# Patient Record
Sex: MALE | Race: WHITE | Employment: OTHER | URBAN - METROPOLITAN AREA
[De-identification: names, ages, dates, MRNs, and addresses within clinical notes are randomized per-mention and may not be internally consistent; named-entity substitution may affect disease eponyms.]

---

## 2017-01-12 ENCOUNTER — TELEPHONE (OUTPATIENT)
Dept: FAMILY MEDICINE CLINIC | Age: 72
End: 2017-01-12

## 2017-01-12 RX ORDER — TRAZODONE HYDROCHLORIDE 50 MG/1
50 TABLET ORAL NIGHTLY
Qty: 30 TABLET | Refills: 5 | Status: SHIPPED | OUTPATIENT
Start: 2017-01-12 | End: 2017-10-09 | Stop reason: SDUPTHER

## 2017-03-28 PROBLEM — J02.9 SORE THROAT: Status: ACTIVE | Noted: 2017-03-28

## 2017-04-14 ENCOUNTER — TELEPHONE (OUTPATIENT)
Dept: FAMILY MEDICINE CLINIC | Age: 72
End: 2017-04-14

## 2017-04-14 RX ORDER — AZITHROMYCIN 250 MG/1
TABLET, FILM COATED ORAL
Qty: 1 PACKET | Refills: 0 | Status: SHIPPED | OUTPATIENT
Start: 2017-04-14 | End: 2017-04-24

## 2017-05-30 ENCOUNTER — TELEPHONE (OUTPATIENT)
Dept: FAMILY MEDICINE CLINIC | Age: 72
End: 2017-05-30

## 2017-06-06 ENCOUNTER — OFFICE VISIT (OUTPATIENT)
Dept: CARDIOLOGY CLINIC | Age: 72
End: 2017-06-06

## 2017-06-06 VITALS
WEIGHT: 185 LBS | HEART RATE: 69 BPM | SYSTOLIC BLOOD PRESSURE: 134 MMHG | BODY MASS INDEX: 23 KG/M2 | DIASTOLIC BLOOD PRESSURE: 84 MMHG | HEIGHT: 75 IN

## 2017-06-06 DIAGNOSIS — I35.1 AORTIC VALVE INSUFFICIENCY, UNSPECIFIED ETIOLOGY: Chronic | ICD-10-CM

## 2017-06-06 DIAGNOSIS — I10 ESSENTIAL HYPERTENSION, BENIGN: Chronic | ICD-10-CM

## 2017-06-06 DIAGNOSIS — E78.00 PURE HYPERCHOLESTEROLEMIA: Primary | Chronic | ICD-10-CM

## 2017-06-06 PROCEDURE — 99213 OFFICE O/P EST LOW 20 MIN: CPT | Performed by: INTERNAL MEDICINE

## 2017-08-29 ENCOUNTER — OFFICE VISIT (OUTPATIENT)
Dept: DERMATOLOGY | Age: 72
End: 2017-08-29

## 2017-08-29 DIAGNOSIS — L57.0 AK (ACTINIC KERATOSIS): ICD-10-CM

## 2017-08-29 DIAGNOSIS — L82.1 SK (SEBORRHEIC KERATOSIS): Primary | ICD-10-CM

## 2017-08-29 DIAGNOSIS — Z85.828 HISTORY OF BASAL CELL CARCINOMA: ICD-10-CM

## 2017-08-29 PROCEDURE — 17000 DESTRUCT PREMALG LESION: CPT | Performed by: DERMATOLOGY

## 2017-08-29 PROCEDURE — 99202 OFFICE O/P NEW SF 15 MIN: CPT | Performed by: DERMATOLOGY

## 2017-08-29 PROCEDURE — 17003 DESTRUCT PREMALG LES 2-14: CPT | Performed by: DERMATOLOGY

## 2017-08-29 RX ORDER — ATORVASTATIN CALCIUM 10 MG/1
10 TABLET, FILM COATED ORAL DAILY
COMMUNITY
End: 2019-09-17 | Stop reason: SDUPTHER

## 2017-09-08 ENCOUNTER — OFFICE VISIT (OUTPATIENT)
Dept: CARDIOLOGY CLINIC | Age: 72
End: 2017-09-08

## 2017-09-08 VITALS
HEIGHT: 75 IN | WEIGHT: 190 LBS | DIASTOLIC BLOOD PRESSURE: 62 MMHG | BODY MASS INDEX: 23.62 KG/M2 | HEART RATE: 61 BPM | SYSTOLIC BLOOD PRESSURE: 112 MMHG

## 2017-09-08 DIAGNOSIS — I34.0 MITRAL VALVE INSUFFICIENCY, UNSPECIFIED ETIOLOGY: Primary | Chronic | ICD-10-CM

## 2017-09-08 DIAGNOSIS — I35.1 AORTIC VALVE REGURGITATION, UNSPECIFIED ETIOLOGY: Chronic | ICD-10-CM

## 2017-09-08 DIAGNOSIS — I10 ESSENTIAL HYPERTENSION, BENIGN: Chronic | ICD-10-CM

## 2017-09-08 DIAGNOSIS — E78.2 MIXED HYPERLIPIDEMIA: Chronic | ICD-10-CM

## 2017-09-08 PROCEDURE — 99214 OFFICE O/P EST MOD 30 MIN: CPT | Performed by: INTERNAL MEDICINE

## 2017-09-08 PROCEDURE — 93000 ELECTROCARDIOGRAM COMPLETE: CPT | Performed by: INTERNAL MEDICINE

## 2017-10-10 RX ORDER — TRAZODONE HYDROCHLORIDE 50 MG/1
TABLET ORAL
Qty: 30 TABLET | Refills: 2 | Status: SHIPPED | OUTPATIENT
Start: 2017-10-10 | End: 2018-03-09 | Stop reason: SDUPTHER

## 2017-10-18 ENCOUNTER — TELEPHONE (OUTPATIENT)
Dept: FAMILY MEDICINE CLINIC | Age: 72
End: 2017-10-18

## 2017-10-18 PROBLEM — Z86.69 HX OF MIGRAINE HEADACHES: Status: ACTIVE | Noted: 2017-10-18

## 2017-10-18 PROBLEM — C61 PROSTATE CANCER (HCC): Status: ACTIVE | Noted: 2017-10-18

## 2017-11-07 RX ORDER — ZOLPIDEM TARTRATE 5 MG/1
TABLET ORAL
Qty: 90 TABLET | Refills: 1 | OUTPATIENT
Start: 2017-11-07 | End: 2018-05-14 | Stop reason: SDUPTHER

## 2018-03-23 ENCOUNTER — HOSPITAL ENCOUNTER (OUTPATIENT)
Dept: CARDIOLOGY | Age: 73
Discharge: HOME | End: 2018-03-23
Attending: INTERNAL MEDICINE | Admitting: INTERNAL MEDICINE

## 2018-03-23 ENCOUNTER — OFFICE VISIT (OUTPATIENT)
Dept: CARDIOLOGY CLINIC | Age: 73
End: 2018-03-23

## 2018-03-23 VITALS
SYSTOLIC BLOOD PRESSURE: 130 MMHG | DIASTOLIC BLOOD PRESSURE: 64 MMHG | HEIGHT: 75 IN | WEIGHT: 192 LBS | BODY MASS INDEX: 23.87 KG/M2 | HEART RATE: 66 BPM

## 2018-03-23 DIAGNOSIS — I35.1 AORTIC VALVE INSUFFICIENCY, ETIOLOGY OF CARDIAC VALVE DISEASE UNSPECIFIED: Chronic | ICD-10-CM

## 2018-03-23 DIAGNOSIS — I35.9 AORTIC VALVE DISORDER: ICD-10-CM

## 2018-03-23 DIAGNOSIS — I34.0 MITRAL VALVE INSUFFICIENCY, UNSPECIFIED ETIOLOGY: Chronic | ICD-10-CM

## 2018-03-23 DIAGNOSIS — I10 ESSENTIAL HYPERTENSION, BENIGN: Primary | Chronic | ICD-10-CM

## 2018-03-23 DIAGNOSIS — E78.2 MIXED HYPERLIPIDEMIA: Chronic | ICD-10-CM

## 2018-03-23 LAB
LEFT VENTRICULAR EJECTION FRACTION HIGH VALUE: 60 %
LEFT VENTRICULAR EJECTION FRACTION MODE: NORMAL
LV EF: 55 %
LV EF: 58 %
LVEF MODALITY: NORMAL

## 2018-03-23 PROCEDURE — 99214 OFFICE O/P EST MOD 30 MIN: CPT | Performed by: INTERNAL MEDICINE

## 2018-03-23 NOTE — PROGRESS NOTES
Vitals:    03/23/18 1432   BP: 130/64   Site: Left Arm   Position: Sitting   Cuff Size: Medium Adult   Pulse: 66   Weight: 192 lb (87.1 kg)   Height: 6' 3\" (1.905 m)     Body mass index is 24 kg/m². Wt Readings from Last 3 Encounters:   03/23/18 192 lb (87.1 kg)   10/17/17 190 lb (86.2 kg)   09/08/17 190 lb (86.2 kg)      BP Readings from Last 3 Encounters:   03/23/18 130/64   10/17/17 132/80   09/08/17 112/62      Constitutional and General Appearance:  appears stated age  Eyes - no xanthelasma  Respiratory:  · Normal excursion and expansion without use of accessory muscles  · Resp Auscultation: Normal breath sounds without dullness  Cardiovascular:  · The apical impulses not displaced  · Heart is regular rate and rhythm with normal S1, S2, 2/6 holosystolic murmur in the left lateral decubitus position, 1/6 BARRON, no clearly audible diastolic murmur  · PMI is normal  · The carotid upstroke is normal, no bruit noted   · JVP is not elevated  · Peripheral pulses are symmetrical  · There is no clubbing, cyanosis of the extremities  · No edema  · No varicosities  · Femoral Arteries: 2+ and equal without bruits  · Pedal Pulses: 2+ and equal   Abdomen:  · No masses or tenderness  · Aorta not palpable  · Normal bowel sounds  Neurological/Psychiatric:  · Alert and oriented x3  · Moves all extremities well  · Exhibits normal gait balance and coordination      Assessment/Plan  1. Essential hypertension, benign -stable   2. Aortic valve insufficiency, etiology of cardiac valve disease unspecified -stable  Echo 3/23/18: EF 55%, MVP, mild to moderate MR, mild to moderate AR   3. Mixed hyperlipidemia -will be seeing new physician and obtain thru her   4. Mitral valve insufficiency, unspecified etiology -stable see above       Echo findings explained. Continue regular exercise. FU 1 year. Will let me know if SOB or decrease in exercise tolerance. Thank you for allowing to me to participate in the care of Lili Jose.

## 2018-04-30 RX ORDER — METOPROLOL SUCCINATE 50 MG/1
TABLET, EXTENDED RELEASE ORAL
Qty: 90 TABLET | Refills: 0 | Status: SHIPPED | OUTPATIENT
Start: 2018-04-30 | End: 2018-07-17 | Stop reason: SDUPTHER

## 2018-04-30 RX ORDER — ESCITALOPRAM OXALATE 20 MG/1
TABLET ORAL
Qty: 90 TABLET | Refills: 0 | Status: SHIPPED | OUTPATIENT
Start: 2018-04-30 | End: 2018-10-22

## 2018-05-14 RX ORDER — ZOLPIDEM TARTRATE 5 MG/1
TABLET ORAL
Qty: 60 TABLET | Refills: 0 | OUTPATIENT
Start: 2018-05-14 | End: 2018-07-17 | Stop reason: SDUPTHER

## 2018-06-15 DIAGNOSIS — F41.1 GENERALIZED ANXIETY DISORDER: Primary | ICD-10-CM

## 2018-06-20 RX ORDER — TRAZODONE HYDROCHLORIDE 50 MG/1
TABLET ORAL
Qty: 30 TABLET | Refills: 0 | Status: SHIPPED | OUTPATIENT
Start: 2018-06-20 | End: 2018-07-17

## 2018-07-17 ENCOUNTER — OFFICE VISIT (OUTPATIENT)
Dept: FAMILY MEDICINE CLINIC | Age: 73
End: 2018-07-17

## 2018-07-17 VITALS
WEIGHT: 194 LBS | HEART RATE: 65 BPM | OXYGEN SATURATION: 97 % | BODY MASS INDEX: 24.25 KG/M2 | DIASTOLIC BLOOD PRESSURE: 80 MMHG | SYSTOLIC BLOOD PRESSURE: 132 MMHG

## 2018-07-17 DIAGNOSIS — F32.A DEPRESSION, UNSPECIFIED DEPRESSION TYPE: ICD-10-CM

## 2018-07-17 DIAGNOSIS — G47.00 INSOMNIA, UNSPECIFIED TYPE: ICD-10-CM

## 2018-07-17 DIAGNOSIS — L72.3 SEBACEOUS CYST: ICD-10-CM

## 2018-07-17 DIAGNOSIS — F41.9 ANXIETY: ICD-10-CM

## 2018-07-17 DIAGNOSIS — E78.2 MIXED HYPERLIPIDEMIA: Chronic | ICD-10-CM

## 2018-07-17 DIAGNOSIS — I34.0 MITRAL VALVE INSUFFICIENCY, UNSPECIFIED ETIOLOGY: Chronic | ICD-10-CM

## 2018-07-17 DIAGNOSIS — Z12.11 COLON CANCER SCREENING: ICD-10-CM

## 2018-07-17 DIAGNOSIS — I10 ESSENTIAL HYPERTENSION, BENIGN: Primary | Chronic | ICD-10-CM

## 2018-07-17 PROBLEM — J02.9 SORE THROAT: Status: RESOLVED | Noted: 2017-03-28 | Resolved: 2018-07-17

## 2018-07-17 PROCEDURE — G8510 SCR DEP NEG, NO PLAN REQD: HCPCS | Performed by: FAMILY MEDICINE

## 2018-07-17 PROCEDURE — 3288F FALL RISK ASSESSMENT DOCD: CPT | Performed by: FAMILY MEDICINE

## 2018-07-17 PROCEDURE — 99214 OFFICE O/P EST MOD 30 MIN: CPT | Performed by: FAMILY MEDICINE

## 2018-07-17 RX ORDER — METOPROLOL SUCCINATE 50 MG/1
TABLET, EXTENDED RELEASE ORAL
Qty: 90 TABLET | Refills: 1 | Status: SHIPPED | OUTPATIENT
Start: 2018-07-17 | End: 2019-01-15 | Stop reason: SDUPTHER

## 2018-07-17 RX ORDER — DULOXETIN HYDROCHLORIDE 30 MG/1
60 CAPSULE, DELAYED RELEASE ORAL DAILY
Qty: 60 CAPSULE | Refills: 2 | Status: SHIPPED | OUTPATIENT
Start: 2018-07-17 | End: 2018-10-20 | Stop reason: SDUPTHER

## 2018-07-17 RX ORDER — ZOLPIDEM TARTRATE 5 MG/1
TABLET ORAL
Qty: 90 TABLET | Refills: 1 | Status: SHIPPED | OUTPATIENT
Start: 2018-07-17 | End: 2018-07-24

## 2018-07-17 ASSESSMENT — PATIENT HEALTH QUESTIONNAIRE - PHQ9
1. LITTLE INTEREST OR PLEASURE IN DOING THINGS: 0
SUM OF ALL RESPONSES TO PHQ QUESTIONS 1-9: 1
SUM OF ALL RESPONSES TO PHQ9 QUESTIONS 1 & 2: 0
SUM OF ALL RESPONSES TO PHQ9 QUESTIONS 1 & 2: 1
2. FEELING DOWN, DEPRESSED OR HOPELESS: 1
SUM OF ALL RESPONSES TO PHQ QUESTIONS 1-9: 0
1. LITTLE INTEREST OR PLEASURE IN DOING THINGS: 0
2. FEELING DOWN, DEPRESSED OR HOPELESS: 0

## 2018-07-17 ASSESSMENT — ENCOUNTER SYMPTOMS
COUGH: 0
SHORTNESS OF BREATH: 0

## 2018-07-17 NOTE — PROGRESS NOTES
migraine without mention of status migrainosus     Mitral valve disorders(424.0)     Other and unspecified hyperlipidemia     Prostate cancer (HCC)     tx with cyber knife radiation    Vitreous degeneration      Past Surgical History:   Procedure Laterality Date    TONSILLECTOMY      TURP       No Known Allergies  Outpatient Prescriptions Marked as Taking for the 7/17/18 encounter (Office Visit) with Amedeo Osgood, MD   Medication Sig Dispense Refill    metoprolol succinate (TOPROL XL) 50 MG extended release tablet TAKE 1 TABLET EVERY DAY 90 tablet 1    zolpidem (AMBIEN) 5 MG tablet TAKE 1 TABLET  NIGHTLY AS NEEDED FOR SLEEP. 90 tablet 1    DULoxetine (CYMBALTA) 30 MG extended release capsule Take 2 capsules by mouth daily 60 capsule 2    zoster recombinant adjuvanted vaccine (SHINGRIX) 50 MCG SUSR injection Inject 0.5 mLs into the muscle once for 1 dose Repeat dose in 2-6 months. 0.5 mL 0    escitalopram (LEXAPRO) 20 MG tablet TAKE 1 TABLET EVERY DAY 90 tablet 0    butalbital-acetaminophen-caffeine (FIORICET, ESGIC) -40 MG per tablet TAKE ONE TABLET BY MOUTH EVERY 4 HOURS AS NEEDED FOR HEADACHE 180 tablet 0    LYSINE PO Take by mouth      atorvastatin (LIPITOR) 10 MG tablet Take 10 mg by mouth daily      Probiotic Product (ALIGN PO) Take 1 tablet by mouth daily.  vitamin B-12 (CYANOCOBALAMIN) 500 MCG tablet Take 500 mcg by mouth daily. Social History   Substance Use Topics    Smoking status: Never Smoker    Smokeless tobacco: Never Used    Alcohol use 0.0 oz/week      Comment: occasional     Family History   Problem Relation Age of Onset    Hypertension Mother     Heart Disease Mother     Stroke Mother     Heart Attack Father          Review of Systems   Constitutional: Negative for activity change, chills, fatigue and fever. Respiratory: Negative for cough and shortness of breath. Cardiovascular: Negative for chest pain and leg swelling.    Musculoskeletal: Positive for arthralgias. Skin:        Skin bump left side of neck       Objective:    /80   Pulse 65   Wt 194 lb (88 kg)   SpO2 97%   BMI 24.25 kg/m²   Weight: 194 lb (88 kg)     BP Readings from Last 3 Encounters:   07/17/18 132/80   03/23/18 130/64   10/17/17 132/80     Wt Readings from Last 3 Encounters:   07/17/18 194 lb (88 kg)   03/23/18 192 lb (87.1 kg)   10/17/17 190 lb (86.2 kg)       Physical Exam   Constitutional: He appears well-developed and well-nourished. Cardiovascular: Normal rate, regular rhythm and normal heart sounds. No murmur heard. 2+pedal pulses; no LE edema   Pulmonary/Chest: Effort normal and breath sounds normal. No respiratory distress. He has no wheezes. He has no rales. Skin:   Left side neck 1cm nodule; sebaceous gland enlargement. Expression of white cheesy substance with gentl pressure. Psychiatric: He has a normal mood and affect. His behavior is normal.       Assessment/Plan:    1. Essential hypertension, benign  Continue current medication.   - metoprolol succinate (TOPROL XL) 50 MG extended release tablet; TAKE 1 TABLET EVERY DAY  Dispense: 90 tablet; Refill: 1  - Comprehensive Metabolic Panel; Future  - CBC Auto Differential; Future    2. Mixed hyperlipidemia    - Lipid Panel; Future    3. Mitral valve insufficiency, unspecified etiology  Follows with cardiology; will schedule regular visit with them prior to preop exam with us. 4. Colon cancer screening    - Jenn Kellogg MD ([facility])    5. Insomnia, unspecified type  Gets relief with the ambien; usually breaks in half. Discussed that sleep should improve with better anxiety control; but if not can consider switch to ER version.  - zolpidem (AMBIEN) 5 MG tablet; TAKE 1 TABLET  NIGHTLY AS NEEDED FOR SLEEP. Dispense: 90 tablet; Refill: 1    6. Depression, unspecified depression type  Stop lexapro and start cymbalta. - DULoxetine (CYMBALTA) 30 MG extended release capsule;  Take 2 capsules by mouth

## 2018-07-24 DIAGNOSIS — F41.1 GENERALIZED ANXIETY DISORDER: ICD-10-CM

## 2018-07-24 RX ORDER — TRAZODONE HYDROCHLORIDE 50 MG/1
TABLET ORAL
Qty: 30 TABLET | Refills: 0
Start: 2018-07-24 | End: 2018-10-20 | Stop reason: SDUPTHER

## 2018-08-21 DIAGNOSIS — F41.1 GENERALIZED ANXIETY DISORDER: ICD-10-CM

## 2018-08-22 RX ORDER — TRAZODONE HYDROCHLORIDE 50 MG/1
TABLET ORAL
Qty: 30 TABLET | Refills: 0 | Status: SHIPPED | OUTPATIENT
Start: 2018-08-22 | End: 2018-08-30 | Stop reason: SDUPTHER

## 2018-08-23 ENCOUNTER — HOSPITAL ENCOUNTER (OUTPATIENT)
Dept: ENDOSCOPY | Age: 73
Discharge: HOME | End: 2018-08-23
Attending: INTERNAL MEDICINE | Admitting: INTERNAL MEDICINE

## 2018-08-23 DIAGNOSIS — I35.9 AORTIC VALVE DISORDER: ICD-10-CM

## 2018-08-23 ASSESSMENT — ENCOUNTER SYMPTOMS: DYSPNEA ACTIVITY LEVEL: AFTER AMBULATING 2 FLIGHTS OF STAIRS

## 2018-08-23 NOTE — ANESTHESIA PRE-OP
[address] [facility] Anesthesiology  Pre-Anesthesia Evaluation/Consultation       Name:  Cyndi Butt                                         Age:  68 y.o. MRN:    [id number]           Procedure (Scheduled): COLONOSCOPY   Surgeon:     Dr. Kesha Hewitt MD     No Known Allergies  Patient Active Problem List   Diagnosis    Essential hypertension, benign    Aortic insufficiency    Vitreous degeneration    Hypertrophy of prostate without urinary obstruction and other lower urinary tract symptoms (LUTS)    Irritable bowel syndrome    Anxiety state    Hyperlipidemia    Mitral regurgitation    Prostate cancer ([address]    Hx of migraine headaches     Past Medical History:   Diagnosis Date    Anxiety state, unspecified     Arthritis 03/2017    Hyperlipidemia     Hypertension     Hypertrophy of prostate without urinary obstruction and other lower urinary tract symptoms (LUTS)     Irritable bowel syndrome     Migraine, unspecified, without mention of intractable migraine without mention of status migrainosus     Mitral valve disorders(424.0)     Other and unspecified hyperlipidemia     Prostate cancer (HCC)     tx with cyber knife radiation    Vitreous degeneration      Past Surgical History:   Procedure Laterality Date    TONSILLECTOMY      TURP       Social History   Substance Use Topics    Smoking status: Never Smoker    Smokeless tobacco: Never Used    Alcohol use 0.0 oz/week      Comment: occasional       Prior to Admission medications    Medication Sig Start Date End Date Taking?  Authorizing Provider   traZODone (DESYREL) 50 MG tablet TAKE ONE TABLET BY MOUTH ONCE NIGHTLY 8/22/18   Kacie Quinn MD   traZODone (DESYREL) 50 MG tablet TAKE ONE TABLET BY MOUTH ONCE NIGHTLY 7/24/18   Kacie Quinn MD   metoprolol succinate (TOPROL XL) 50 MG extended release tablet TAKE 1 TABLET EVERY DAY 7/17/18   Kacie Quinn MD   DULoxetine (CYMBALTA) 30 MG extended release capsule Take 2 hypertension: moderate, valvular problems/murmurs: AI and MR, MENEZES: after ambulating 2 flights of stairs, hyperlipidemia    (-)  angina, orthopnea and PND    ECG reviewed  Rhythm: regular  Rate: normal  Echocardiogram reviewed         Beta Blocker:  Dose within 24 Hrs         Neuro/Psych:   (+) headaches: migraine headaches, depression/anxiety             GI/Hepatic/Renal:             Endo/Other:    (+) : arthritis: OA., .                 Abdominal:           Vascular:                                        Anesthesia Plan      MAC     ASA 3       Induction: intravenous. Anesthetic plan and risks discussed with patient. Plan discussed with CRNA. DOS STAFF ADDENDUM:    Pt seen and examined, chart reviewed (including anesthesia, drug and allergy history). No interval changes to history and physical examination. Anesthetic plan, risks, benefits, alternatives, and personnel involved discussed with patient. Patient verbalized an understanding and agrees to proceed.       Shaye Peres DO  August 23, 2018  8:53 AM

## 2018-08-30 ENCOUNTER — OFFICE VISIT (OUTPATIENT)
Dept: DERMATOLOGY | Age: 73
End: 2018-08-30

## 2018-08-30 DIAGNOSIS — L57.0 AK (ACTINIC KERATOSIS): Primary | ICD-10-CM

## 2018-08-30 DIAGNOSIS — L82.1 SK (SEBORRHEIC KERATOSIS): ICD-10-CM

## 2018-08-30 DIAGNOSIS — L72.0 EPIDERMOID CYST: ICD-10-CM

## 2018-08-30 DIAGNOSIS — L30.8 ASTEATOTIC DERMATITIS: ICD-10-CM

## 2018-08-30 PROCEDURE — 17000 DESTRUCT PREMALG LESION: CPT | Performed by: DERMATOLOGY

## 2018-08-30 PROCEDURE — 17003 DESTRUCT PREMALG LES 2-14: CPT | Performed by: DERMATOLOGY

## 2018-08-30 PROCEDURE — 99213 OFFICE O/P EST LOW 20 MIN: CPT | Performed by: DERMATOLOGY

## 2018-08-30 RX ORDER — TRIAMCINOLONE ACETONIDE 1 MG/G
CREAM TOPICAL
Qty: 80 G | Refills: 1 | Status: SHIPPED | OUTPATIENT
Start: 2018-08-30 | End: 2019-11-12 | Stop reason: SDUPTHER

## 2018-10-20 DIAGNOSIS — F41.9 ANXIETY: ICD-10-CM

## 2018-10-20 DIAGNOSIS — F41.1 GENERALIZED ANXIETY DISORDER: ICD-10-CM

## 2018-10-20 DIAGNOSIS — F32.A DEPRESSION, UNSPECIFIED DEPRESSION TYPE: ICD-10-CM

## 2018-10-20 RX ORDER — DULOXETIN HYDROCHLORIDE 30 MG/1
CAPSULE, DELAYED RELEASE ORAL
Qty: 60 CAPSULE | Refills: 0 | Status: SHIPPED | OUTPATIENT
Start: 2018-10-20 | End: 2018-11-20 | Stop reason: SDUPTHER

## 2018-10-20 RX ORDER — TRAZODONE HYDROCHLORIDE 50 MG/1
TABLET ORAL
Qty: 30 TABLET | Refills: 0 | Status: SHIPPED | OUTPATIENT
Start: 2018-10-20 | End: 2018-11-21 | Stop reason: SDUPTHER

## 2018-10-22 ENCOUNTER — OFFICE VISIT (OUTPATIENT)
Dept: CARDIOLOGY CLINIC | Age: 73
End: 2018-10-22
Payer: MEDICARE

## 2018-10-22 VITALS
HEIGHT: 75 IN | DIASTOLIC BLOOD PRESSURE: 70 MMHG | BODY MASS INDEX: 24 KG/M2 | SYSTOLIC BLOOD PRESSURE: 130 MMHG | WEIGHT: 193 LBS | HEART RATE: 72 BPM

## 2018-10-22 DIAGNOSIS — I10 ESSENTIAL HYPERTENSION, BENIGN: Primary | Chronic | ICD-10-CM

## 2018-10-22 DIAGNOSIS — E78.2 MIXED HYPERLIPIDEMIA: Chronic | ICD-10-CM

## 2018-10-22 DIAGNOSIS — I34.0 MITRAL VALVE INSUFFICIENCY, UNSPECIFIED ETIOLOGY: Chronic | ICD-10-CM

## 2018-10-22 PROCEDURE — 93000 ELECTROCARDIOGRAM COMPLETE: CPT | Performed by: INTERNAL MEDICINE

## 2018-10-22 PROCEDURE — 99214 OFFICE O/P EST MOD 30 MIN: CPT | Performed by: INTERNAL MEDICINE

## 2018-11-02 ENCOUNTER — OFFICE VISIT (OUTPATIENT)
Dept: FAMILY MEDICINE CLINIC | Age: 73
End: 2018-11-02
Payer: MEDICARE

## 2018-11-02 VITALS
TEMPERATURE: 97.8 F | DIASTOLIC BLOOD PRESSURE: 82 MMHG | OXYGEN SATURATION: 97 % | BODY MASS INDEX: 23.75 KG/M2 | HEART RATE: 78 BPM | SYSTOLIC BLOOD PRESSURE: 120 MMHG | HEIGHT: 75 IN | WEIGHT: 191 LBS

## 2018-11-02 DIAGNOSIS — F41.1 ANXIETY STATE: ICD-10-CM

## 2018-11-02 DIAGNOSIS — I34.0 MITRAL VALVE INSUFFICIENCY, UNSPECIFIED ETIOLOGY: Chronic | ICD-10-CM

## 2018-11-02 DIAGNOSIS — I35.1 AORTIC VALVE INSUFFICIENCY, ETIOLOGY OF CARDIAC VALVE DISEASE UNSPECIFIED: Chronic | ICD-10-CM

## 2018-11-02 DIAGNOSIS — I10 ESSENTIAL HYPERTENSION, BENIGN: Chronic | ICD-10-CM

## 2018-11-02 DIAGNOSIS — M16.11 ARTHRITIS OF RIGHT HIP: Primary | ICD-10-CM

## 2018-11-02 DIAGNOSIS — E78.2 MIXED HYPERLIPIDEMIA: Chronic | ICD-10-CM

## 2018-11-02 PROCEDURE — 99213 OFFICE O/P EST LOW 20 MIN: CPT | Performed by: FAMILY MEDICINE

## 2018-11-02 NOTE — PROGRESS NOTES
well-nourished. HENT:   Mouth/Throat: Uvula is midline and mucous membranes are normal.   Cardiovascular: Normal rate and regular rhythm. Murmur heard. Systolic murmur is present with a grade of 2/6   2+pedal pulses; no LE edema   Pulmonary/Chest: Effort normal and breath sounds normal. No respiratory distress. He has no wheezes. He has no rales. Abdominal: Soft. Bowel sounds are normal.   Neurological: He is alert and oriented to person, place, and time. He has normal strength. Reflex Scores:       Bicep reflexes are 2+ on the right side and 2+ on the left side. Brachioradialis reflexes are 2+ on the right side and 2+ on the left side. Patellar reflexes are 2+ on the right side and 2+ on the left side. Psychiatric: He has a normal mood and affect. His speech is normal.         EKG Interpretation: reviewed by cardiology  Lab Review Yes       Assessment:     [facility] was seen today for pre-op exam.    Diagnoses and all orders for this visit:    Arthritis of right hip - awaiting right total hip    Essential hypertension, benign- stable; continue current medications    Mixed hyperlipidemia- stable; continue current medication    Aortic valve insufficiency, etiology of cardiac valve disease unspecified- stable; follows with cardiology    Mitral valve insufficiency, unspecified etiology- stable; follows with cardiology    Anxiety state- stable; ok to increase cymbalta to 60mg at night and 30mg in morning when he desires; he will let us know. 68 y. o.patient  approved for Surgery        Plan:     1. Preoperative workup as follows:none  2. Change in medication regimen before surgery: hold all medications morning of surgery except metoprolol  3. No contraindications to planned surgery    Note electronically signed by provider.

## 2018-11-20 DIAGNOSIS — F41.9 ANXIETY: ICD-10-CM

## 2018-11-20 DIAGNOSIS — F32.A DEPRESSION, UNSPECIFIED DEPRESSION TYPE: ICD-10-CM

## 2018-11-20 RX ORDER — DULOXETIN HYDROCHLORIDE 30 MG/1
CAPSULE, DELAYED RELEASE ORAL
Qty: 60 CAPSULE | Refills: 5 | Status: SHIPPED | OUTPATIENT
Start: 2018-11-20 | End: 2019-05-21 | Stop reason: SDUPTHER

## 2018-11-21 DIAGNOSIS — F41.1 GENERALIZED ANXIETY DISORDER: ICD-10-CM

## 2018-11-21 RX ORDER — TRAZODONE HYDROCHLORIDE 50 MG/1
TABLET ORAL
Qty: 90 TABLET | Refills: 1 | Status: SHIPPED | OUTPATIENT
Start: 2018-11-21 | End: 2019-07-22 | Stop reason: SDUPTHER

## 2018-12-10 ENCOUNTER — TELEPHONE (OUTPATIENT)
Dept: PHARMACY | Facility: CLINIC | Age: 73
End: 2018-12-10

## 2018-12-10 DIAGNOSIS — Z96.641 HISTORY OF TOTAL RIGHT HIP ARTHROPLASTY: Primary | ICD-10-CM

## 2018-12-10 PROCEDURE — 1111F DSCHRG MED/CURRENT MED MERGE: CPT

## 2018-12-10 RX ORDER — ZOLPIDEM TARTRATE 5 MG/1
5 TABLET ORAL NIGHTLY PRN
COMMUNITY
End: 2019-04-04 | Stop reason: SDUPTHER

## 2018-12-10 NOTE — TELEPHONE ENCOUNTER
CLINICAL PHARMACY NOTE  Post-Discharge Transitions of Care ([facility])    Non-face-to-face services provided:  Assessment and support for treatment adherence and medication management-Medication Review    Subjective/Objective:  Whitley Richards is a 68 y.o. male. Patient was discharged from [facility] on 11-13-18 with a diagnosis of primary osteoarthritis of right hip resulting in total hip replacement. Patient outreach to review discharge medications and provide medication review and management. Spoke with patient    No Known Allergies    Medication Sig    aspirin 325 MG tablet  · New medication at discharge - pt to complete 30 days of therapy for DVT prophylaxis. Last dose on 12/13/18. Added to home med list.  Take 325 mg by mouth daily For 30 days for DVT prophylaxis s/p hip replacement - last dose on 12/13/18.  zolpidem (AMBIEN) 5 MG tablet  · Added to home medication list as pt reported. Take 5 mg by mouth nightly as needed for Sleep. Chetna Shrestha traZODone (DESYREL) 50 MG tablet TAKE ONE TABLET BY MOUTH ONCE NIGHTLY    DULoxetine (CYMBALTA) 30 MG extended release capsule TAKE TWO CAPSULES BY MOUTH DAILY    Misc Natural Products (FIBER 7 PO) Take 1 tablet by mouth daily     butalbital-acetaminophen-caffeine (FIORICET, ESGIC) -40 MG per tablet TAKE ONE TABLET BY MOUTH EVERY 4 HOURS AS NEEDED FOR HEADACHE    triamcinolone (KENALOG) 0.1 % cream Apply to affected areas on the legs twice daily for up to 2 weeks or until improved.  metoprolol succinate (TOPROL XL) 50 MG extended release tablet TAKE 1 TABLET EVERY DAY    LYSINE PO Take 1 tablet by mouth daily     atorvastatin (LIPITOR) 10 MG tablet Take 10 mg by mouth daily    Probiotic Product (ALIGN PO) Take 1 tablet by mouth daily.  vitamin B-12 (CYANOCOBALAMIN) 500 MCG tablet Take 500 mcg by mouth daily. Meds to Beds:NA    CrCl cannot be calculated (Patient's most recent lab result is older than the maximum 120 days allowed. ).

## 2018-12-11 ENCOUNTER — TELEPHONE (OUTPATIENT)
Dept: PHARMACY | Facility: CLINIC | Age: 73
End: 2018-12-11

## 2018-12-11 RX ORDER — ASPIRIN 325 MG
325 TABLET ORAL DAILY
COMMUNITY
End: 2019-02-21

## 2019-01-11 ENCOUNTER — TELEPHONE (OUTPATIENT)
Dept: CARDIOLOGY CLINIC | Age: 74
End: 2019-01-11

## 2019-01-15 DIAGNOSIS — I10 ESSENTIAL HYPERTENSION, BENIGN: Chronic | ICD-10-CM

## 2019-01-15 RX ORDER — METOPROLOL SUCCINATE 50 MG/1
TABLET, EXTENDED RELEASE ORAL
Qty: 90 TABLET | Refills: 0 | Status: SHIPPED | OUTPATIENT
Start: 2019-01-15 | End: 2019-04-22 | Stop reason: SDUPTHER

## 2019-02-21 ENCOUNTER — OFFICE VISIT (OUTPATIENT)
Dept: PRIMARY CARE CLINIC | Age: 74
End: 2019-02-21
Payer: MEDICARE

## 2019-02-21 ENCOUNTER — TELEPHONE (OUTPATIENT)
Dept: CARDIOLOGY CLINIC | Age: 74
End: 2019-02-21

## 2019-02-21 VITALS
TEMPERATURE: 97.1 F | BODY MASS INDEX: 25.62 KG/M2 | RESPIRATION RATE: 18 BRPM | DIASTOLIC BLOOD PRESSURE: 78 MMHG | HEIGHT: 73 IN | HEART RATE: 81 BPM | WEIGHT: 193.3 LBS | SYSTOLIC BLOOD PRESSURE: 127 MMHG

## 2019-02-21 DIAGNOSIS — Z01.818 PRE-OP EVALUATION: Primary | ICD-10-CM

## 2019-02-21 PROCEDURE — 99212 OFFICE O/P EST SF 10 MIN: CPT | Performed by: INTERNAL MEDICINE

## 2019-02-21 RX ORDER — BUTALBITAL, ACETAMINOPHEN AND CAFFEINE 50; 325; 40 MG/1; MG/1; MG/1
TABLET ORAL
Qty: 180 TABLET | Refills: 0 | Status: SHIPPED | OUTPATIENT
Start: 2019-02-21 | End: 2019-09-10 | Stop reason: SDUPTHER

## 2019-02-21 ASSESSMENT — PATIENT HEALTH QUESTIONNAIRE - PHQ9
2. FEELING DOWN, DEPRESSED OR HOPELESS: 0
SUM OF ALL RESPONSES TO PHQ QUESTIONS 1-9: 0
SUM OF ALL RESPONSES TO PHQ9 QUESTIONS 1 & 2: 0
1. LITTLE INTEREST OR PLEASURE IN DOING THINGS: 0
SUM OF ALL RESPONSES TO PHQ QUESTIONS 1-9: 0

## 2019-03-19 ENCOUNTER — TELEPHONE (OUTPATIENT)
Dept: PHARMACY | Facility: CLINIC | Age: 74
End: 2019-03-19

## 2019-03-19 DIAGNOSIS — Z79.899 ENCOUNTER FOR MEDICATION REVIEW: Primary | ICD-10-CM

## 2019-03-19 PROCEDURE — 1111F DSCHRG MED/CURRENT MED MERGE: CPT | Performed by: PHARMACIST

## 2019-03-25 RX ORDER — ASPIRIN 81 MG/1
81 TABLET, CHEWABLE ORAL 2 TIMES DAILY
COMMUNITY
Start: 2019-02-26 | End: 2019-03-28

## 2019-04-04 ENCOUNTER — OFFICE VISIT (OUTPATIENT)
Dept: FAMILY MEDICINE CLINIC | Age: 74
End: 2019-04-04
Payer: MEDICARE

## 2019-04-04 VITALS
HEART RATE: 83 BPM | BODY MASS INDEX: 25.15 KG/M2 | OXYGEN SATURATION: 96 % | SYSTOLIC BLOOD PRESSURE: 134 MMHG | DIASTOLIC BLOOD PRESSURE: 86 MMHG | WEIGHT: 188 LBS

## 2019-04-04 DIAGNOSIS — G47.00 INSOMNIA, UNSPECIFIED TYPE: ICD-10-CM

## 2019-04-04 DIAGNOSIS — E78.2 MIXED HYPERLIPIDEMIA: ICD-10-CM

## 2019-04-04 DIAGNOSIS — C61 PROSTATE CANCER (HCC): ICD-10-CM

## 2019-04-04 DIAGNOSIS — R42 VERTIGO: Primary | ICD-10-CM

## 2019-04-04 DIAGNOSIS — F41.1 ANXIETY STATE: ICD-10-CM

## 2019-04-04 PROCEDURE — 99214 OFFICE O/P EST MOD 30 MIN: CPT | Performed by: FAMILY MEDICINE

## 2019-04-04 RX ORDER — ZOLPIDEM TARTRATE 5 MG/1
5 TABLET ORAL NIGHTLY PRN
Qty: 30 TABLET | Refills: 3 | Status: SHIPPED | OUTPATIENT
Start: 2019-04-04 | End: 2019-11-21 | Stop reason: SDUPTHER

## 2019-04-04 SDOH — HEALTH STABILITY: MENTAL HEALTH: HOW OFTEN DO YOU HAVE A DRINK CONTAINING ALCOHOL?: 4 OR MORE TIMES A WEEK

## 2019-04-04 SDOH — HEALTH STABILITY: MENTAL HEALTH: HOW MANY STANDARD DRINKS CONTAINING ALCOHOL DO YOU HAVE ON A TYPICAL DAY?: 1 OR 2

## 2019-04-04 NOTE — PROGRESS NOTES
Patient is here today for Annual Check up. Former patient of Dr. Heath Sis here to establish care with me. Has several complaints. Symptoms started 8 day(s) ago. States dizziness feels like vertigo. Symptoms are worse with lying down. When lying in bed turned over and felt like the room kept moving, didn't last too long, was careful when getting up. Sx's seem more today and feel \"balanced\" related, feels like the balance is \"off kilter. \"    Nausea and/or vomiting: no  Tinnitus/ringing in ears: yes  Numbness or tingling: no  Weakness: yes  Recent cold symptoms: no  Has history of vertigo: yes    Has tried nothing for the symptoms. Has a lump on the left side of the neck for 6-7 months, bothersome at times once it started growing. Has seen dermatologist Dr. Frank Bustillo in the past for this. Told has a cyst.     Had prostate cancer a year ago, just saw urology and good report PSA down. Right hip replacement in November with Dr. Mikey Norman. Doing well. Left Total knee in February with Dr. Mikey Norman, had a lot of pain at night, reaction to oxycodone from surgery and started taking tramadol. No longer needing the tramadol but will manage the pain with 2 ibuprofen a day. Dr. Mikey Norman advised to take flexeril. Saw Dr. Mikey Norman 1 week ago for surgery follow up, another follow up appt is scheduled weeks out. Having some drainage from the surgical site, covering with a band-aid. Taking fioricet 1 daily for \"occular migraines. \"  Has been on for years, works well no SE.    2-3 weeks after knee surgery had a lot of trouble sleeping from pain, got more depressed, felt more tired during the day and still feel tired, in PT twice a week, not but now sleeping better and mood has improved. Takes 1/2 tablet of the Ambien 5mg for sleep, if having difficulty with sleep with take the other half. No SE with meds. Taking cymbalta for depression, doing well and no SE. Not seeing a therapist or counselors.   Saw a therapist when in his 20's, had a good experience with therapy. Doing better now. Patient's medications, allergies, past medical, surgical, social and family histories were reviewed and updated in the EHR as appropriate. Body mass index is 25.15 kg/m². Vitals:    04/04/19 1437   BP: 134/86   Site: Left Upper Arm   Position: Sitting   Cuff Size: Medium Adult   Pulse: 83   SpO2: 96%   Weight: 188 lb (85.3 kg)     Wt Readings from Last 3 Encounters:   04/04/19 188 lb (85.3 kg)   02/21/19 193 lb 4.8 oz (87.7 kg)   11/02/18 191 lb (86.6 kg)     PHQ score: PHQ-9 Total Score: 0 (2/21/2019  1:41 PM)      GENERAL:Alert and oriented x 4 NAD, affect appropriate and overweight, well hydrated, well developed. PERRL, EOMI, no nystagmus  NECK:supple and non tender without mass, no thyromegaly or thyroid nodules, no cervical lymphadenopathy  LUNG:clear to auscultation bilaterally with normal respiratory effort  CV: Normal heart sounds, regular rate and rhythm without murmurs  EXTREMETY: no loss of hair, no edema, normal pedal pulses bilaterally  CN grossly intact   Normal UE and LE strength bilaterally  Normal reflexes bilaterally knee and elbow  Normal sensation to light touch  Normal FTN, ARIANA normal, neg rhomberg, neg pronator drift  Normal gait  Tandem gait slightly off balance, heel walk normal, toe walk normal        ASSESSMENT AND PLAN:       Jodee Flores was seen today for annual exam.    Diagnoses and all orders for this visit:    Vertigo  ? Viral  Monitor sx  If not improving over next week consider further eval    Insomnia, unspecified type  -     zolpidem (AMBIEN) 5 MG tablet; Take 1 tablet by mouth nightly as needed for Sleep for up to 30 days.  -Stable, continue current medications. Mixed hyperlipidemia  -Stable, continue current medications. Anxiety state  -Stable, continue current medications. Prostate cancer [facility])  F/u with urology as scheduled    Other orders  -     mupirocin (BACTROBAN) 2 % ointment;  Apply topically 3 times

## 2019-04-05 ENCOUNTER — TELEPHONE (OUTPATIENT)
Dept: DERMATOLOGY | Age: 74
End: 2019-04-05

## 2019-04-05 NOTE — TELEPHONE ENCOUNTER
Patient called has a spot on the back of his neck. It is getting bigger and would like for it to be removed.     Call back# [phone]

## 2019-04-22 DIAGNOSIS — I10 ESSENTIAL HYPERTENSION, BENIGN: Chronic | ICD-10-CM

## 2019-04-22 RX ORDER — METOPROLOL SUCCINATE 50 MG/1
TABLET, EXTENDED RELEASE ORAL
Qty: 90 TABLET | Refills: 0 | Status: SHIPPED | OUTPATIENT
Start: 2019-04-22 | End: 2019-07-18 | Stop reason: SDUPTHER

## 2019-05-03 ENCOUNTER — PROCEDURE VISIT (OUTPATIENT)
Dept: DERMATOLOGY | Age: 74
End: 2019-05-03
Payer: MEDICARE

## 2019-05-03 DIAGNOSIS — L72.0 EPIDERMOID CYST: Primary | ICD-10-CM

## 2019-05-03 DIAGNOSIS — R20.9 DISTURBANCE OF SKIN SENSATION: ICD-10-CM

## 2019-05-03 PROCEDURE — 99999 PR OFFICE/OUTPT VISIT,PROCEDURE ONLY: CPT | Performed by: DERMATOLOGY

## 2019-05-03 PROCEDURE — 11422 EXC H-F-NK-SP B9+MARG 1.1-2: CPT | Performed by: DERMATOLOGY

## 2019-05-03 NOTE — PATIENT INSTRUCTIONS
Wound Care Instructions    · Keep the bandage in place for 24 hours. · Cleanse the wound with mild soapy water daily   Gently dry the area.  Apply Vaseline or petroleum jelly to the wound using a cotton tipped applicator.  Cover with a clean bandage.  Repeat this process until the biopsy site is healed.  If you had stitches placed, continue treating the site until the stitches are removed. Remember to make an appointment to return to have your stitches removed by our staff.  You may shower and bathe as usual.       ** Biopsy results generally take around 7 business days to come back. If you have not heard from us by then, please call the office at [phone] between 8AM and 4PM Monday through Friday.

## 2019-05-03 NOTE — PROGRESS NOTES
[facility]ermat[facility]  Anitha Dempsey MD  [address] [place] [address]  1945    68 y.o. male     Date of Visit: 5/3/2019    Chief Complaint: cyst on the neck    History of Present Illness:    1. Here today for an enlarging painful cyst on the neck. Had LT TKA on 2/25/19. Had RT total hip replacement in Nov 2018. Review of Systems:  Gen: Feels well, good sense of health. Heme: No abnormal bruising or bleeding. Past Medical History, Family History, Surgical History, Medications and Allergies reviewed. Past Medical History:   Diagnosis Date    Anxiety state, unspecified     Arthritis 03/2017    Hyperlipidemia     Hypertension     Hypertrophy of prostate without urinary obstruction and other lower urinary tract symptoms (LUTS)     Irritable bowel syndrome     Migraine, unspecified, without mention of intractable migraine without mention of status migrainosus     Mitral valve disorders(424.0)     Other and unspecified hyperlipidemia     Prostate cancer (HCC)     tx with cyber knife radiation    Vitreous degeneration      Past Surgical History:   Procedure Laterality Date    HIP ARTHROPLASTY Right 11/2018    Ottoniel tyler, Dr. Angeline Ann Right     x3 due to infection    JOINT REPLACEMENT  02/2019    KNEE ARTHROPLASTY Left 02/2019    Dr. Chou Pale   Allergen Reactions    Oxycodone      \"severe hiccups\" per patient on at least 2 occasions; does tolerate tramadol     Outpatient Medications Marked as Taking for the 5/3/19 encounter (Procedure visit) with Héctor Mcintyre MD   Medication Sig Dispense Refill    metoprolol succinate (TOPROL XL) 50 MG extended release tablet TAKE ONE TABLET BY MOUTH DAILY 90 tablet 0    zolpidem (AMBIEN) 5 MG tablet Take 1 tablet by mouth nightly as needed for Sleep for up to 30 days.  30 tablet 3    IBUPROFEN PO Take 200-400 mg by mouth 2 times daily as needed     

## 2019-05-21 DIAGNOSIS — F41.9 ANXIETY: ICD-10-CM

## 2019-05-21 DIAGNOSIS — F32.A DEPRESSION, UNSPECIFIED DEPRESSION TYPE: ICD-10-CM

## 2019-05-21 RX ORDER — DULOXETIN HYDROCHLORIDE 30 MG/1
CAPSULE, DELAYED RELEASE ORAL
Qty: 60 CAPSULE | Refills: 12 | Status: SHIPPED | OUTPATIENT
Start: 2019-05-21 | End: 2020-05-27

## 2019-07-18 DIAGNOSIS — I10 ESSENTIAL HYPERTENSION, BENIGN: Chronic | ICD-10-CM

## 2019-07-18 RX ORDER — METOPROLOL SUCCINATE 50 MG/1
TABLET, EXTENDED RELEASE ORAL
Qty: 90 TABLET | Refills: 3 | Status: SHIPPED | OUTPATIENT
Start: 2019-07-18 | End: 2020-06-19

## 2019-07-22 ENCOUNTER — OFFICE VISIT (OUTPATIENT)
Dept: FAMILY MEDICINE CLINIC | Age: 74
End: 2019-07-22
Payer: MEDICARE

## 2019-07-22 VITALS
SYSTOLIC BLOOD PRESSURE: 118 MMHG | OXYGEN SATURATION: 95 % | TEMPERATURE: 98.3 F | BODY MASS INDEX: 25.55 KG/M2 | DIASTOLIC BLOOD PRESSURE: 76 MMHG | WEIGHT: 191 LBS

## 2019-07-22 DIAGNOSIS — R05.9 COUGH: Primary | ICD-10-CM

## 2019-07-22 PROCEDURE — 99213 OFFICE O/P EST LOW 20 MIN: CPT | Performed by: FAMILY MEDICINE

## 2019-07-22 RX ORDER — TRAZODONE HYDROCHLORIDE 50 MG/1
TABLET ORAL
Qty: 90 TABLET | Refills: 1 | Status: SHIPPED | OUTPATIENT
Start: 2019-07-22 | End: 2020-01-06

## 2019-07-25 RX ORDER — BENZONATATE 200 MG/1
200 CAPSULE ORAL 3 TIMES DAILY PRN
Qty: 30 CAPSULE | Refills: 0 | Status: SHIPPED | OUTPATIENT
Start: 2019-07-25 | End: 2019-08-01

## 2019-07-25 RX ORDER — AZITHROMYCIN 250 MG/1
250 TABLET, FILM COATED ORAL SEE ADMIN INSTRUCTIONS
Qty: 6 TABLET | Refills: 0 | Status: SHIPPED | OUTPATIENT
Start: 2019-07-25 | End: 2019-07-30

## 2019-08-05 RX ORDER — BENZONATATE 200 MG/1
200 CAPSULE ORAL 3 TIMES DAILY PRN
Qty: 30 CAPSULE | Refills: 0 | Status: SHIPPED | OUTPATIENT
Start: 2019-08-05 | End: 2019-08-12

## 2019-08-05 NOTE — TELEPHONE ENCOUNTER
Patient is in [place] and he had finished a Zpac about 5 days ago for a cough and congestion . The congestion is better but the cough is not . The cough is just as bad as it has been and he is wondering if you are willing to call in something to a pharmacy for the cough . Pharmacy [facility] , [address] [place] [address]. Please advise .

## 2019-08-24 ENCOUNTER — OFFICE VISIT (OUTPATIENT)
Dept: FAMILY MEDICINE CLINIC | Age: 74
End: 2019-08-24
Payer: MEDICARE

## 2019-08-24 VITALS
SYSTOLIC BLOOD PRESSURE: 140 MMHG | WEIGHT: 190 LBS | DIASTOLIC BLOOD PRESSURE: 90 MMHG | HEART RATE: 69 BPM | BODY MASS INDEX: 25.41 KG/M2 | OXYGEN SATURATION: 99 %

## 2019-08-24 DIAGNOSIS — K62.5 BRIGHT RED RECTAL BLEEDING: Primary | ICD-10-CM

## 2019-08-24 PROCEDURE — 99213 OFFICE O/P EST LOW 20 MIN: CPT | Performed by: FAMILY MEDICINE

## 2019-08-24 ASSESSMENT — ENCOUNTER SYMPTOMS
DIARRHEA: 1
RECTAL PAIN: 0
HEMATOCHEZIA: 1

## 2019-08-24 NOTE — PROGRESS NOTES
Mother     Stroke Mother     Heart Attack Father     Diabetes type 2  Brother     Colon Cancer Brother 76    No Known Problems Brother      Social History     Socioeconomic History    Marital status:      Spouse name: Not on file    Number of children: Not on file    Years of education: Not on file    Highest education level: Not on file   Occupational History    Not on file   Social Needs    Financial resource strain: Not on file    Food insecurity:     Worry: Not on file     Inability: Not on file    Transportation needs:     Medical: Not on file     Non-medical: Not on file   Tobacco Use    Smoking status: Never Smoker    Smokeless tobacco: Never Used   Substance and Sexual Activity    Alcohol use:  Yes     Alcohol/week: 0.0 standard drinks     Frequency: 4 or more times a week     Drinks per session: 1 or 2     Binge frequency: Never     Comment: occasional    Drug use: No    Sexual activity: Not on file   Lifestyle    Physical activity:     Days per week: Not on file     Minutes per session: Not on file    Stress: Not on file   Relationships    Social connections:     Talks on phone: Not on file     Gets together: Not on file     Attends [employer] service: Not on file     Active member of club or organization: Not on file     Attends meetings of clubs or organizations: Not on file     Relationship status: Not on file    Intimate partner violence:     Fear of current or ex partner: Not on file     Emotionally abused: Not on file     Physically abused: Not on file     Forced sexual activity: Not on file   Other Topics Concern    Not on file   Social History Narrative    Not on file     Allergies   Allergen Reactions    Oxycodone      \"severe hiccups\" per patient on at least 2 occasions; does tolerate tramadol     Current Outpatient Medications   Medication Sig Dispense Refill    traZODone (DESYREL) 50 MG tablet TAKE ONE TABLET BY MOUTH ONCE NIGHTLY 90 tablet 1    metoprolol succinate (TOPROL XL) 50 MG extended release tablet TAKE ONE TABLET BY MOUTH DAILY 90 tablet 3    DULoxetine (CYMBALTA) 30 MG extended release capsule TAKE TWO CAPSULES BY MOUTH DAILY 60 capsule 12    IBUPROFEN PO Take 200-400 mg by mouth 2 times daily as needed      butalbital-acetaminophen-caffeine (FIORICET, ESGIC) -40 MG per tablet TAKE ONE TABLET BY MOUTH EVERY 4 HOURS AS NEEDED FOR HEADACHE 180 tablet 0    Misc Natural Products (FIBER 7 PO) Take 1 tablet by mouth daily       triamcinolone (KENALOG) 0.1 % cream Apply to affected areas on the legs twice daily for up to 2 weeks or until improved. 80 g 1    LYSINE PO Take 1 tablet by mouth daily       atorvastatin (LIPITOR) 10 MG tablet Take 10 mg by mouth daily       No current facility-administered medications for this visit. Review of Systems   Constitutional: Positive for fatigue. Negative for fever. Gastrointestinal: Positive for diarrhea ( soft stools,rarely watery. Lot of rumbling in abdomen), hematochezia and hemorrhoids. Negative for rectal pain. Neurological: Positive for headaches. Psychiatric/Behavioral: The patient is nervous/anxious ( feeling tense. ). OBJECTIVE:    BP (!) 140/90 (Position: Standing)   Pulse 69   Wt 190 lb (86.2 kg)   SpO2 99%   BMI 25.41 kg/m²    Physical Exam   Constitutional: He appears well-developed and well-nourished. No distress. HENT:   Head: Normocephalic and atraumatic. Pulmonary/Chest: Effort normal.   Genitourinary: Rectal exam shows internal hemorrhoid. Rectal exam shows no fissure, no mass, no tenderness and anal tone normal.   Genitourinary Comments: Anoscopic exam revealed some blood. No active bleeding Hemorrhoid seen internally. Vitals reviewed. ASSESSMENT/PLAN:    Rios Torres was seen today for rectal bleeding. Diagnoses and all orders for this visit:    Bright red rectal bleeding    Etiology is most likely from bleeding internal hemorrhoid.   Orthostatic vital signs are

## 2019-08-29 ENCOUNTER — HOSPITAL ENCOUNTER (OUTPATIENT)
Age: 74
Discharge: HOME | End: 2019-08-29
Payer: MEDICARE

## 2019-08-30 ENCOUNTER — HOSPITAL ENCOUNTER (OUTPATIENT)
Age: 74
Discharge: HOME | End: 2019-08-30
Payer: MEDICARE

## 2019-08-30 DIAGNOSIS — E78.2 MIXED HYPERLIPIDEMIA: Primary | Chronic | ICD-10-CM

## 2019-08-30 DIAGNOSIS — I10 ESSENTIAL HYPERTENSION, BENIGN: Chronic | ICD-10-CM

## 2019-08-30 LAB — C DIFF TOXIN/ANTIGEN: NORMAL

## 2019-08-30 PROCEDURE — 87329 GIARDIA AG IA: CPT

## 2019-08-30 PROCEDURE — 87324 CLOSTRIDIUM AG IA: CPT

## 2019-08-30 PROCEDURE — 87449 NOS EACH ORGANISM AG IA: CPT

## 2019-08-31 LAB — GIARDIA ANTIGEN STOOL: NORMAL

## 2019-09-03 ENCOUNTER — OFFICE VISIT (OUTPATIENT)
Dept: DERMATOLOGY | Age: 74
End: 2019-09-03
Payer: MEDICARE

## 2019-09-03 DIAGNOSIS — L20.84 INTRINSIC ATOPIC DERMATITIS: Primary | ICD-10-CM

## 2019-09-03 DIAGNOSIS — Z85.828 HISTORY OF BASAL CELL CARCINOMA (BCC): ICD-10-CM

## 2019-09-03 PROCEDURE — 99213 OFFICE O/P EST LOW 20 MIN: CPT | Performed by: DERMATOLOGY

## 2019-09-03 RX ORDER — TRIAMCINOLONE ACETONIDE 1 MG/G
CREAM TOPICAL
Qty: 450 G | Refills: 2 | Status: SHIPPED | OUTPATIENT
Start: 2019-09-03 | End: 2020-09-08

## 2019-09-03 NOTE — PROGRESS NOTES
BY MOUTH ONCE NIGHTLY 90 tablet 1    metoprolol succinate (TOPROL XL) 50 MG extended release tablet TAKE ONE TABLET BY MOUTH DAILY 90 tablet 3    DULoxetine (CYMBALTA) 30 MG extended release capsule TAKE TWO CAPSULES BY MOUTH DAILY 60 capsule 12    IBUPROFEN PO Take 200-400 mg by mouth 2 times daily as needed      butalbital-acetaminophen-caffeine (FIORICET, ESGIC) -40 MG per tablet TAKE ONE TABLET BY MOUTH EVERY 4 HOURS AS NEEDED FOR HEADACHE 180 tablet 0    Misc Natural Products (FIBER 7 PO) Take 1 tablet by mouth daily       triamcinolone (KENALOG) 0.1 % cream Apply to affected areas on the legs twice daily for up to 2 weeks or until improved. 80 g 1    LYSINE PO Take 1 tablet by mouth daily       atorvastatin (LIPITOR) 10 MG tablet Take 10 mg by mouth daily         Physical Examination       The following were examined and determined to be normal: Psych/Neuro, Scalp/hair, Head/face, Conjunctivae/eyelids, Gums/teeth/lips, Neck and Nails/digits. The following were examined and determined to be abnormal: Breast/axilla/chest, Abdomen, Back, RUE, LUE, RLE and LLE. Well-appearing. 1.  Scattered on the trunk and extremities are large ill-defined scaly erythematous patches. 2.  Clear. Assessment and Plan     1. Intrinsic atopic dermatitis - 8 to 10% BSA    Triamcinolone 0.1% cream twice daily for up to 2 weeks or until improved. Avoid hot showers and encouraged daily use of a thicker cream based moisturizer. 2. History of basal cell carcinoma (BCC) - clear    Sun protective behaviors and self skin examinations were encouraged. Call for any new or concerning lesions. Return in about 1 year (around 9/3/2020).

## 2019-09-10 RX ORDER — BUTALBITAL, ACETAMINOPHEN AND CAFFEINE 50; 325; 40 MG/1; MG/1; MG/1
TABLET ORAL
Qty: 180 TABLET | Refills: 0 | Status: SHIPPED | OUTPATIENT
Start: 2019-09-10 | End: 2020-02-11 | Stop reason: SDUPTHER

## 2019-09-16 NOTE — TELEPHONE ENCOUNTER
Patient requesting a medication refill.   Medication atorvastatin (LIPITOR) 10 MG tablet  Pharmacy [facility] [address] [place] [address] [phone] [facility] [phone]  Last office visit: 07/22/2019  Next office visit: Visit date not found

## 2019-09-17 RX ORDER — ATORVASTATIN CALCIUM 10 MG/1
10 TABLET, FILM COATED ORAL DAILY
Qty: 90 TABLET | Refills: 3 | Status: SHIPPED | OUTPATIENT
Start: 2019-09-17 | End: 2020-07-30

## 2019-11-12 ENCOUNTER — OFFICE VISIT (OUTPATIENT)
Dept: CARDIOLOGY CLINIC | Age: 74
End: 2019-11-12
Payer: MEDICARE

## 2019-11-12 VITALS
OXYGEN SATURATION: 98 % | BODY MASS INDEX: 23.31 KG/M2 | HEIGHT: 75 IN | DIASTOLIC BLOOD PRESSURE: 78 MMHG | SYSTOLIC BLOOD PRESSURE: 132 MMHG | HEART RATE: 67 BPM | WEIGHT: 187.5 LBS

## 2019-11-12 DIAGNOSIS — E78.5 HYPERLIPIDEMIA, UNSPECIFIED HYPERLIPIDEMIA TYPE: ICD-10-CM

## 2019-11-12 DIAGNOSIS — I35.1 NONRHEUMATIC AORTIC VALVE INSUFFICIENCY: Chronic | ICD-10-CM

## 2019-11-12 DIAGNOSIS — I10 ESSENTIAL HYPERTENSION: Primary | ICD-10-CM

## 2019-11-12 PROCEDURE — 99214 OFFICE O/P EST MOD 30 MIN: CPT | Performed by: INTERNAL MEDICINE

## 2019-11-21 DIAGNOSIS — G47.00 INSOMNIA, UNSPECIFIED TYPE: ICD-10-CM

## 2019-11-21 RX ORDER — ZOLPIDEM TARTRATE 5 MG/1
TABLET ORAL
Qty: 30 TABLET | Refills: 2 | Status: SHIPPED | OUTPATIENT
Start: 2019-11-21 | End: 2020-05-27

## 2020-01-06 RX ORDER — TRAZODONE HYDROCHLORIDE 50 MG/1
TABLET ORAL
Qty: 90 TABLET | Refills: 0 | Status: SHIPPED | OUTPATIENT
Start: 2020-01-06 | End: 2020-02-28

## 2020-02-11 RX ORDER — BUTALBITAL, ACETAMINOPHEN AND CAFFEINE 50; 325; 40 MG/1; MG/1; MG/1
TABLET ORAL
Qty: 180 TABLET | Refills: 1 | Status: SHIPPED | OUTPATIENT
Start: 2020-02-11 | End: 2021-05-25

## 2020-02-14 ENCOUNTER — OFFICE VISIT (OUTPATIENT)
Dept: FAMILY MEDICINE CLINIC | Age: 75
End: 2020-02-14
Payer: MEDICARE

## 2020-02-14 ENCOUNTER — NURSE TRIAGE (OUTPATIENT)
Dept: OTHER | Facility: CLINIC | Age: 75
End: 2020-02-14

## 2020-02-14 VITALS
WEIGHT: 190 LBS | SYSTOLIC BLOOD PRESSURE: 104 MMHG | BODY MASS INDEX: 23.75 KG/M2 | HEART RATE: 115 BPM | OXYGEN SATURATION: 98 % | DIASTOLIC BLOOD PRESSURE: 70 MMHG

## 2020-02-14 PROCEDURE — 99214 OFFICE O/P EST MOD 30 MIN: CPT | Performed by: FAMILY MEDICINE

## 2020-02-14 RX ORDER — CYCLOBENZAPRINE HCL 10 MG
TABLET ORAL
COMMUNITY
Start: 2020-01-29

## 2020-02-14 NOTE — TELEPHONE ENCOUNTER
Reason for Disposition   Unexplained headache that is present > 24 hours    Protocols used: HEADACHE-ADULT-[place]    Pt c/o headaches that are intermittent that started about a month ago. He has been seen at [facility] for this, but it has been a couple of weeks ago. He states the steroid helped , but it isn't anymore. He has also been on ibuprofen, Fioricet and muscle relaxer. At times his pain is 6/10, right now it is 3-4/10. Pt states he is able to function during the days. Pt with h/o occular migraines. The pain is generally on one side of his head and goes down into his neck. Caller reports symptoms as documented above. Caller informed of disposition. Soft transfer to pre-service center to schedule appointment as recommended. Care advice as documented. Please do not respond to the triage nurse through this encounter. Any subsequent communication should be directly with the patient.

## 2020-02-14 NOTE — PROGRESS NOTES
Patient presents today for headaches for 5 weeks. Pt reports 5 weeks ago he left for [place] and started with headaches . Headaches are not every day but seem to be worse in the mornings, primarily on the Left side behind the eye and radiating up to the top of his head into his neck, took fioricet without relief, states normally fioricet helps with his usual HA. States usually taking 1 tablet of the fioricet every day or every other day, but if getting an occular migraine will take 1 every 4 hours. Pt states before he went to [place] he saw the eye doctor for floaters in the R eye, was checked out and cleared. He states this is not his normal headache. Pt states does not keep him from working, but when the headaches are at their worst he will feel like he wants to go home to sleep. States cold cloths on his head help. Denies light sensitivity, no nausea, no watery eyes, not seeing any floaters or waves, no n/v.    He reports going to Urgent Care in [place] twice for his headaches. Pt states Urgent care gave him norco and prednisone for 5 days for his first visit and the second visit a Depo medrol and dexamethasone injection, and oral flexeril. He states the injections helped but could tell when they wore off. Pt states while he was taking the prednisone the headaches went away, but came back once rx was completed. Pt continues to take the flexeril and fioricet without relief, states not taking flexeril on a regular basis. Pt states he lost his son 3 months ago, has been having dreams and when he wakes up from the dream his head will be \"pounding. \"  He states for a few nights he had dreams he was responsible for someone he could not take care of or someone drowning and he couldn't get to them. He reports be open to see a counselor. 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 Denies neck pain but does have pain if he pushes on his neck. No pain in hips or shoulders.   No numbness or weakness. He states when he sleeps on his side at night he will sometimes get numbness in hand but changes position and goes away. Wife reports to patient he snores at night, no gasping for air. Wife reports to him that his snoring doesn't sound like regular snoring but says it is getting worse. Allergies   Allergen Reactions    Oxycodone      \"severe hiccups\" per patient on at least 2 occasions; does tolerate tramadol       Vitals:    02/14/20 1630   BP: 104/70   Site: Left Upper Arm   Position: Sitting   Cuff Size: Medium Adult   Pulse: 115   SpO2: 98%   Weight: 190 lb (86.2 kg)     Wt Readings from Last 3 Encounters:   02/14/20 190 lb (86.2 kg)   11/12/19 187 lb 8 oz (85 kg)   08/24/19 190 lb (86.2 kg)     Body mass index is 23.75 kg/m². Alert and oriented x 4 NAD, affect appropriate and normal appearing weight, well hydrated, well developed. Bilateral pinna, canal and TM normal  Neck no bruits  Neck FROM w/o pain, NT palpation spinous processes and paraspinal muscles  Bilateral temporal arteries prominent, NT to palpation  Lungs CTA B with good air movement  CV RRR no M appreciated  CN grossly intact  PERRL, EOMI, no nystagmus  Normal UE and LE strength shoulder, elbow, , Hip flexor 4+/5 R (previous hip surgery), 5/5 on L, knee flex/ext 5/5 bilaterally, foot plantar/dorsiflex 5/5 bilaterally  Normal sensation to light touch upper and lower extremities bilaterally  Normal FTN, ARIANA normal, neg rhomberg, neg pronator drift  Normal gait  Tandem gait slightly unsteady but able to do        ASSESSMENT AND PLAN:       Michelle Erickson was seen today for headache. Diagnoses and all orders for this visit:    Acute intractable headache, unspecified headache type  -     SEDIMENTATION RATE;  Future  -     C-REACTIVE PROTEIN; Future    Check labs to r/o temporal arteritis  May need imaging if labs unrevealing       Snoring  -     Rand Recio MD, Sleep Medicine, [facility]    Grief reaction  Discussed

## 2020-02-15 LAB
C-REACTIVE PROTEIN WIDE RANGE: 14.7 MG/L
SEDIMENTATION RATE, ERYTHROCYTE: 3 MM/HR (ref 0–20)

## 2020-02-18 RX ORDER — PREDNISONE 20 MG/1
TABLET ORAL
Qty: 90 TABLET | Refills: 1 | Status: SHIPPED
Start: 2020-02-18 | End: 2020-05-11 | Stop reason: DRUGHIGH

## 2020-02-18 NOTE — TELEPHONE ENCOUNTER
Start prednisone 20mg tabs, 3 tabs daily (60mg) #90 1 RF    Have him get a CRP and ESR level in 2-3 weeks    Tell him if it is temporal arteritis he will be on steroids for 6-12 months and we will be gradually tapering the dose.      He will need monthly lab tests so please make the CRP and ESR standing orders

## 2020-02-25 ENCOUNTER — PATIENT MESSAGE (OUTPATIENT)
Dept: FAMILY MEDICINE CLINIC | Age: 75
End: 2020-02-25

## 2020-02-25 NOTE — TELEPHONE ENCOUNTER
From: Nima Marie  To: Mesha Shea MD  Sent: 2/25/2020 10:11 AM EST  Subject: Visit Follow-Up Question    Doing much better on the predisone. Know that I'm supposed to get a blood test. Would like to do that at [facility] in [facility]. Could the order either be sent to them, or sent to me so I don't need to come to [place] to .   Thanks  [employer]

## 2020-02-28 RX ORDER — TRAZODONE HYDROCHLORIDE 50 MG/1
TABLET ORAL
Qty: 90 TABLET | Refills: 2 | Status: SHIPPED | OUTPATIENT
Start: 2020-02-28 | End: 2020-10-13

## 2020-03-03 ENCOUNTER — OFFICE VISIT (OUTPATIENT)
Dept: VASCULAR SURGERY | Age: 75
End: 2020-03-03
Payer: MEDICARE

## 2020-03-03 VITALS
SYSTOLIC BLOOD PRESSURE: 140 MMHG | BODY MASS INDEX: 23.25 KG/M2 | HEIGHT: 75 IN | DIASTOLIC BLOOD PRESSURE: 80 MMHG | WEIGHT: 187 LBS

## 2020-03-03 LAB
C-REACTIVE PROTEIN WIDE RANGE: 0.28 MG/L
SEDIMENTATION RATE, ERYTHROCYTE: 0 MM/HR (ref 0–20)

## 2020-03-03 PROCEDURE — 99203 OFFICE O/P NEW LOW 30 MIN: CPT | Performed by: SURGERY

## 2020-03-03 ASSESSMENT — ENCOUNTER SYMPTOMS
RESPIRATORY NEGATIVE: 1
GASTROINTESTINAL NEGATIVE: 1
ALLERGIC/IMMUNOLOGIC NEGATIVE: 1

## 2020-03-10 ENCOUNTER — PATIENT MESSAGE (OUTPATIENT)
Dept: FAMILY MEDICINE CLINIC | Age: 75
End: 2020-03-10

## 2020-05-11 RX ORDER — PREDNISONE 1 MG/1
TABLET ORAL
Qty: 70 TABLET | Refills: 1 | Status: SHIPPED
Start: 2020-05-11 | End: 2020-06-15 | Stop reason: DRUGHIGH

## 2020-05-27 RX ORDER — DULOXETIN HYDROCHLORIDE 30 MG/1
CAPSULE, DELAYED RELEASE ORAL
Qty: 60 CAPSULE | Refills: 11 | Status: SHIPPED | OUTPATIENT
Start: 2020-05-27 | End: 2021-07-19

## 2020-05-27 RX ORDER — ZOLPIDEM TARTRATE 5 MG/1
TABLET ORAL
Qty: 30 TABLET | Refills: 1 | Status: SHIPPED | OUTPATIENT
Start: 2020-05-27 | End: 2020-11-02

## 2020-06-15 ENCOUNTER — TELEPHONE (OUTPATIENT)
Dept: FAMILY MEDICINE CLINIC | Age: 75
End: 2020-06-15

## 2020-06-16 RX ORDER — PREDNISONE 1 MG/1
9 TABLET ORAL DAILY
Qty: 270 TABLET | Refills: 3 | Status: SHIPPED | OUTPATIENT
Start: 2020-06-16 | End: 2020-07-09 | Stop reason: DRUGHIGH

## 2020-06-19 RX ORDER — METOPROLOL SUCCINATE 50 MG/1
TABLET, EXTENDED RELEASE ORAL
Qty: 90 TABLET | Refills: 2 | Status: SHIPPED | OUTPATIENT
Start: 2020-06-19 | End: 2021-01-14

## 2020-07-09 ENCOUNTER — OFFICE VISIT (OUTPATIENT)
Dept: FAMILY MEDICINE CLINIC | Age: 75
End: 2020-07-09
Payer: MEDICARE

## 2020-07-09 VITALS
HEART RATE: 73 BPM | SYSTOLIC BLOOD PRESSURE: 144 MMHG | WEIGHT: 194 LBS | BODY MASS INDEX: 24.25 KG/M2 | TEMPERATURE: 97.1 F | DIASTOLIC BLOOD PRESSURE: 90 MMHG | OXYGEN SATURATION: 94 %

## 2020-07-09 PROCEDURE — 99214 OFFICE O/P EST MOD 30 MIN: CPT | Performed by: FAMILY MEDICINE

## 2020-07-09 RX ORDER — PREDNISONE 1 MG/1
8 TABLET ORAL DAILY
Qty: 270 TABLET | Refills: 3 | Status: SHIPPED
Start: 2020-07-09 | End: 2020-08-08

## 2020-07-09 RX ORDER — TAMSULOSIN HYDROCHLORIDE 0.4 MG/1
0.4 CAPSULE ORAL DAILY
COMMUNITY
End: 2020-11-24

## 2020-07-09 NOTE — PATIENT INSTRUCTIONS
Patient Education        Vertigo: Exercises  Introduction  Here are some examples of exercises for you to try. The exercises may be suggested for a condition or for rehabilitation. Start each exercise slowly. Ease off the exercises if you start to have pain. You will be told when to start these exercises and which ones will work best for you. How to do the exercises  Exercise 1   1. Stand with a chair in front of you and a wall behind you. If you begin to fall, you may use them for support. 2. Stand with your feet together and your arms at your sides. 3. Move your head up and down 10 times. Exercise 2   1. Move your head side to side 10 times. Exercise 3   1. Move your head diagonally up and down 10 times. Exercise 4   1. Move your head diagonally up and down 10 times on the other side. Follow-up care is a key part of your treatment and safety. Be sure to make and go to all appointments, and call your doctor if you are having problems. It's also a good idea to know your test results and keep a list of the medicines you take. Where can you learn more? Go to [email or web address]://[email or web address] [email or web address].Tsavo [email or web address] org and sign in to your Nimbus Discovery account. Enter F349 in the "[employer] box to learn more about \"Vertigo: Exercises. \"     If you do not have an account, please click on the \"Sign Up Now\" link. Current as of: July 29, 2019               Content Version: 12.5  © 2703-3747 Healthwise, [employer]. Care instructions adapted under license by [facility] ([facility]). If you have questions about a medical condition or this instruction, always ask your healthcare professional. [place] [address] any warranty or liability for your use of this information. Patient Education        Epley Maneuver for Vertigo: Exercises  Your Care Instructions  The Epley Maneuver is a series of movements your doctor may use to treat your vertigo. Here are the steps for the exercises.  Your doctor or physical therapist will guide you through the movements. A single 10- to 15-minute session often is all that's needed. Crystal debris (canaliths) cause the vertigo. When your head is moved into different positions, the debris moves freely. This may cause your symptoms to stop. How to do the exercises  Step 1   1. You will sit on the doctor's exam table. Your legs will be out in front of you. The doctor or physical therapist will turn your head so that it is skilled nursing between looking straight ahead and looking to the side that causes the worst vertigo. 2. Without changing your head position, he or she will guide you back quickly. Your shoulders will be on the table. Your head will hang over the edge of the table. At this point, the side of your head that is causing the worst vertigo will face the floor. You'll stay in this position for 30 seconds or until your symptoms stop. Step 2   1. Then, the doctor or physical therapist will turn your head to the other side. You don't need to lift your head. The other side of your head will face the floor. You will stay in this position for 30 seconds or until your symptoms stop. Step 3   1. The doctor or physical therapist will help you roll your body in the same direction that your head is facing. You will lie on your side. (For example, if you are looking to your right, you will roll onto your right side.) The side that causes the worst symptoms should be facing up. You'll stay in this position for another 30 seconds or until your symptoms stop. Step 4   1. The doctor or physical therapist will then help you to sit back up. Your legs will hang off the table on the same side that you were facing. Follow-up care is a key part of your treatment and safety. Be sure to make and go to all appointments, and call your doctor if you are having problems. It's also a good idea to know your test results and keep a list of the medicines you take. Where can you learn more?   Go to [email or web address]://[email or web address].[email or web address] org and sign in to your Intellicheck Mobilisa account. Enter Y011 in the NIN [employer] box to learn more about \"Epley Maneuver for Vertigo: Exercises. \"     If you do not have an account, please click on the \"Sign Up Now\" link. Current as of: July 29, 2019               Content Version: 12.5  © 2006-2020 Wowboard. Care instructions adapted under license by [facility] [place] ([facility]). If you have questions about a medical condition or this instruction, always ask your healthcare professional. [place] [address] any warranty or liability for your use of this information. Patient Education        Neck Spasm: Exercises  Introduction  Here are some examples of exercises for you to try. The exercises may be suggested for a condition or for rehabilitation. Start each exercise slowly. Ease off the exercises if you start to have pain. You will be told when to start these exercises and which ones will work best for you. How to do the exercises  Levator scapula stretch   4. Sit in a firm chair, or stand up straight. 5. Gently tilt your head toward your left shoulder. 6. Turn your head to look down into your armpit, bending your head slightly forward. Let the weight of your head stretch your neck muscles. 7. Hold for 15 to 30 seconds. 8. Return to your starting position. 9. Follow the same instructions above, but tilt your head toward your right shoulder. 10. Repeat 2 to 4 times toward each shoulder. Upper trapezius stretch   2. Sit in a firm chair, or stand up straight. 3. This stretch works best if you keep your shoulder down as you lean away from it. To help you remember to do this, start by relaxing your shoulders and lightly holding on to your thighs or your chair. 4. Tilt your head toward your shoulder and hold for 15 to 30 seconds. Let the weight of your head stretch your muscles.   5. If you would like a little added stretch, place your arm behind your back. Use the arm opposite of the direction you are tilting your head. For example, if you are tilting your head to the left, place your right arm behind your back. 6. Repeat 2 to 4 times toward each shoulder. Neck rotation   2. Sit in a firm chair, or stand up straight. 3. Keeping your chin level, turn your head to the right, and hold for 15 to 30 seconds. 4. Turn your head to the left, and hold for 15 to 30 seconds. 5. Repeat 2 to 4 times to each side. Chin tuck   2. Lie on the floor with a rolled-up towel under your neck. Your head should be touching the floor. 3. Slowly bring your chin toward the front of your neck. 4. Hold for a count of 6, and then relax for up to 10 seconds. 5. Repeat 8 to 12 times. Forward neck flexion   1. Sit in a firm chair, or stand up straight. 2. Bend your head forward. 3. Hold for 15 to 30 seconds, then return to your starting position. 4. Repeat 2 to 4 times. Follow-up care is a key part of your treatment and safety. Be sure to make and go to all appointments, and call your doctor if you are having problems. It's also a good idea to know your test results and keep a list of the medicines you take. Where can you learn more? Go to [email or web address]://[email or web address] Reference [email or web address].Riva Digital [email or web address] org and sign in to your Direct Access Software account. Enter P962 in the KylesuShip box to learn more about \"Neck Spasm: Exercises. \"     If you do not have an account, please click on the \"Sign Up Now\" link. Current as of: March 2, 2020               Content Version: 12.5  © 0855-9898 Healthwise, Incorporated. Care instructions adapted under license by [facility] ([facility]). If you have questions about a medical condition or this instruction, always ask your healthcare professional. [place] [address] any warranty or liability for your use of this information. Patient Education        Neck: Exercises  Introduction  Here are some examples of exercises for you to try.  The exercises may be suggested for a condition or for rehabilitation. Start each exercise slowly. Ease off the exercises if you start to have pain. You will be told when to start these exercises and which ones will work best for you. How to do the exercises  Neck stretch   11. This stretch works best if you keep your shoulder down as you lean away from it. To help you remember to do this, start by relaxing your shoulders and lightly holding on to your thighs or your chair. 12. Tilt your head toward your shoulder and hold for 15 to 30 seconds. Let the weight of your head stretch your muscles. 13. If you would like a little added stretch, use your hand to gently and steadily pull your head toward your shoulder. For example, keeping your right shoulder down, lean your head to the left. 14. Repeat 2 to 4 times toward each shoulder. Diagonal neck stretch   7. Turn your head slightly toward the direction you will be stretching, and tilt your head diagonally toward your chest and hold for 15 to 30 seconds. 8. If you would like a little added stretch, use your hand to gently and steadily pull your head forward on the diagonal.  9. Repeat 2 to 4 times toward each side. Dorsal glide stretch   The dorsal glide stretches the back of the neck. If you feel pain, do not glide so far back. Some people find this exercise easier to do while lying on their backs with an ice pack on the neck. 6. Sit or stand tall and look straight ahead. 7. Slowly tuck your chin as you glide your head backward over your body  8. Hold for a count of 6, and then relax for up to 10 seconds. 9. Repeat 8 to 12 times. Chest and shoulder stretch   6. Sit or stand tall and glide your head backward as in the dorsal glide stretch. 7. Raise both arms so that your hands are next to your ears. 8. Take a deep breath, and as you breathe out, lower your elbows down and behind your back.  You will feel your shoulder blades slide down and together, and at the same time you will feel a stretch across your chest and the front of your shoulders. 9. Hold for about 6 seconds, and then relax for up to 10 seconds. 10. Repeat 8 to 12 times. Strengthening: Hands on head   5. Move your head backward, forward, and side to side against gentle pressure from your hands, holding each position for about 6 seconds. 6. Repeat 8 to 12 times. Follow-up care is a key part of your treatment and safety. Be sure to make and go to all appointments, and call your doctor if you are having problems. It's also a good idea to know your test results and keep a list of the medicines you take. Where can you learn more? Go to [email or web address].Obeo [email or web address] and sign in to your Mensia Technologies account. Enter P975 in the TrendU box to learn more about \"Neck: Exercises. \"     If you do not have an account, please click on the \"Sign Up Now\" link. Current as of: March 2, 2020               Content Version: 12.5  © 2006-2020 Healthwise, Incorporated. Care instructions adapted under license by [facility] ([facility]). If you have questions about a medical condition or this instruction, always ask your healthcare professional. [address] any warranty or liability for your use of this information.

## 2020-07-09 NOTE — PROGRESS NOTES
hydrated, well developed. Bilateral pinna, canal and TM normal  Neck no bruits  Lungs CTA B with good air movement  CV RRR no M appreciated  CN grossly intact  PERRL, EOMI, no nystagmus  Normal UE and LE strength shoulder, elbow, , hip, knee and foot  Normal reflexes bilaterally knee and elbow  Normal sensation to light touch upper and lower extremities bilaterally  Normal FTN, ARIANA normal,   + rhomberg, neg pronator drift  Normal gait  Tandem gait unsteady  Intention tremor       ASSESSMENT AND PLAN:       Joo Rahman was seen today for hand problem. Diagnoses and all orders for this visit:    Nonintractable headache, unspecified chronicity pattern, unspecified headache type  -     MRI BRAIN WO CONTRAST; Future    Tremor  -     MRI BRAIN WO CONTRAST; Future    Vertigo  -     MRI BRAIN WO CONTRAST; Future    Other orders  -     predniSONE (DELTASONE) 1 MG tablet; Take 8 tablets by mouth daily        Tremor may be due to prednisone  Concern with increased HA despite low ESR and CRP levels, doubt PMR now given worsening sx  ?  Cluster HA  concernt with othe rneuro sx, needs imaging  If nl refer to neuro

## 2020-07-23 ENCOUNTER — TELEPHONE (OUTPATIENT)
Dept: FAMILY MEDICINE CLINIC | Age: 75
End: 2020-07-23

## 2020-07-23 NOTE — TELEPHONE ENCOUNTER
----- Message from Dewey Munoz MD sent at 7/21/2020  8:03 AM EDT -----  Shows some changes that could just be due to aging but given sx would like him to see neurology to confirm given his sx.   Refer to neurology  Dx: HA, dizziness

## 2020-07-23 NOTE — TELEPHONE ENCOUNTER
Patient notified referral has been placed to [address] [place] [address] [id number] Neurology and they will call the patient to schedule.

## 2020-07-30 RX ORDER — ATORVASTATIN CALCIUM 10 MG/1
TABLET, FILM COATED ORAL
Qty: 90 TABLET | Refills: 0 | Status: SHIPPED | OUTPATIENT
Start: 2020-07-30 | End: 2020-12-14

## 2020-09-08 ENCOUNTER — OFFICE VISIT (OUTPATIENT)
Dept: DERMATOLOGY | Age: 75
End: 2020-09-08
Payer: MEDICARE

## 2020-09-08 VITALS — TEMPERATURE: 96.8 F

## 2020-09-08 PROCEDURE — 17000 DESTRUCT PREMALG LESION: CPT | Performed by: DERMATOLOGY

## 2020-09-08 PROCEDURE — 99213 OFFICE O/P EST LOW 20 MIN: CPT | Performed by: DERMATOLOGY

## 2020-09-08 RX ORDER — TOPIRAMATE 50 MG/1
50 TABLET, FILM COATED ORAL 2 TIMES DAILY
COMMUNITY
End: 2021-08-06

## 2020-09-08 NOTE — PROGRESS NOTES
Formerly [facility]  Bernard Silva MD  [address] [place] [address]  1945    77 y.o. male     Date of Visit: 9/8/2020    Chief Complaint: atopic eczema, skin lesions    History of Present Illness:    1. He returns in follow-up for a history of atopic dermatitis. Reports that he has been flaring over the last few weeks mainly on the upper portion of the trunk. It is typically worse after getting out of the shower or swimming. He reports some burning with application of triamcinolone 0.1% cream in the past.    2.  He complains of a tender lesion on the left lateral neck. 3.  He also reports a stable asymptomatic lesion on the left forearm. 4.  He has a history of a basal cell carcinoma on the left superior helix that was treated in 2009 with Mohs surgery. He denies any signs of recurrence. Review of Systems:  Skin: No new or changing moles. Past Medical History, Family History, Surgical History, Medications and Allergies reviewed.     Past Medical History:   Diagnosis Date    Anxiety state, unspecified     Arthritis 03/2017    Hyperlipidemia     Hypertension     Hypertrophy of prostate without urinary obstruction and other lower urinary tract symptoms (LUTS)     Irritable bowel syndrome     Migraine, unspecified, without mention of intractable migraine without mention of status migrainosus     Mitral valve disorders(424.0)     Other and unspecified hyperlipidemia     Prostate cancer (HCC)     tx with cyber knife radiation    Vitreous degeneration      Past Surgical History:   Procedure Laterality Date    HIP ARTHROPLASTY Right 11/2018    [facility], Dr. Salvatore Licea Right     x3 due to infection    JOINT REPLACEMENT  02/2019    KNEE ARTHROPLASTY Left 02/2019    Dr. Zak Leggett   Allergen Reactions    Oxycodone      \"severe hiccups\" per patient on at least 2 occasions; does tolerate tramadol     Outpatient Medications Marked as Taking for the 9/8/20 encounter (Office Visit) with Asuncion Myers MD   Medication Sig Dispense Refill    topiramate (TOPAMAX) 50 MG tablet Take 50 mg by mouth 2 times daily      triamcinolone (KENALOG) 0.1 % ointment Apply to affected areas twice daily for up to 2 weeks or until improved. For eczema. 80 g 2    atorvastatin (LIPITOR) 10 MG tablet TAKE ONE TABLET BY MOUTH DAILY 90 tablet 0    metoprolol succinate (TOPROL XL) 50 MG extended release tablet TAKE ONE TABLET BY MOUTH DAILY 90 tablet 2    DULoxetine (CYMBALTA) 30 MG extended release capsule TAKE TWO CAPSULES BY MOUTH DAILY 60 capsule 11    traZODone (DESYREL) 50 MG tablet TAKE ONE TABLET BY MOUTH ONCE NIGHTLY 90 tablet 2    cyclobenzaprine (FLEXERIL) 10 MG tablet TAKE 1 TABLET BY MOUTH THREE TIMES A DAY      IBUPROFEN PO Take 200-400 mg by mouth 2 times daily as needed      LYSINE PO Take 1 tablet by mouth daily            Physical Examination       The following were examined and determined to be normal: Psych/Neuro, Scalp/hair, Head/face, Conjunctivae/eyelids, Gums/teeth/lips, Abdomen, Back, RUE, LUE, LLE and Nails/digits. The following were examined and determined to be abnormal: Neck, Breast/axilla/chest, Back and RLE. Well-appearing. 1.  Upper portion of the chest and back with a mixture of round scaly erythematous patches and more ill-defined scaly pink patches. Right leg and thigh - few scaly pink macules and patches. 2.  Left lateral neck with a hyperkeratotic pink papule. Frontal scalp with few skin colored scaly macules. 3.  Left distal extensor forearm with a stuck on appearing verrucous brown plaque. 4.  Clear. Assessment and Plan     1. Intrinsic atopic dermatitis -flaring right now    Triamcinolone 0.1% ointment twice daily for up to 2 weeks until improved. Will prescribe a greater quantity if patient tolerates this okay. Continue use of emollients.     Heliotherapy 2-3 times weekly as long as the weather remains nice. 2. AK (actinic keratosis) -     2 cycles of liquid nitrogen applied to 1 AK on the left lateral neck. Patient was educated regarding the potential risks of blister formation, discomfort, hypopigmentation, and scar. Wound care was discussed. 3. SK (seborrheic keratosis)     Reassurance. 4. History of basal cell carcinoma (BCC) - clear    Sun protective behaviors and self skin examinations were encouraged. Call for any new or concerning lesions. Return in about 1 year (around 9/8/2021).

## 2020-09-10 ENCOUNTER — OFFICE VISIT (OUTPATIENT)
Dept: SLEEP MEDICINE | Age: 75
End: 2020-09-10
Payer: MEDICARE

## 2020-09-10 VITALS
WEIGHT: 190 LBS | HEART RATE: 70 BPM | OXYGEN SATURATION: 96 % | TEMPERATURE: 98.8 F | SYSTOLIC BLOOD PRESSURE: 124 MMHG | RESPIRATION RATE: 18 BRPM | DIASTOLIC BLOOD PRESSURE: 80 MMHG | BODY MASS INDEX: 23.62 KG/M2 | HEIGHT: 75 IN

## 2020-09-10 PROCEDURE — 99204 OFFICE O/P NEW MOD 45 MIN: CPT | Performed by: PSYCHIATRY & NEUROLOGY

## 2020-09-10 ASSESSMENT — ENCOUNTER SYMPTOMS
ALLERGIC/IMMUNOLOGIC NEGATIVE: 1
EYES NEGATIVE: 1
RESPIRATORY NEGATIVE: 1
GASTROINTESTINAL NEGATIVE: 1

## 2020-09-10 ASSESSMENT — SLEEP AND FATIGUE QUESTIONNAIRES
HOW LIKELY ARE YOU TO NOD OFF OR FALL ASLEEP WHILE SITTING AND TALKING TO SOMEONE: 0
HOW LIKELY ARE YOU TO NOD OFF OR FALL ASLEEP WHILE SITTING INACTIVE IN A PUBLIC PLACE: 0
ESS TOTAL SCORE: 5
HOW LIKELY ARE YOU TO NOD OFF OR FALL ASLEEP WHEN YOU ARE A PASSENGER IN A CAR FOR AN HOUR WITHOUT A BREAK: 1
NECK CIRCUMFERENCE (INCHES): 16
HOW LIKELY ARE YOU TO NOD OFF OR FALL ASLEEP IN A CAR, WHILE STOPPED FOR A FEW MINUTES IN TRAFFIC: 0
HOW LIKELY ARE YOU TO NOD OFF OR FALL ASLEEP WHILE WATCHING TV: 1
HOW LIKELY ARE YOU TO NOD OFF OR FALL ASLEEP WHILE SITTING QUIETLY AFTER LUNCH WITHOUT ALCOHOL: 0
HOW LIKELY ARE YOU TO NOD OFF OR FALL ASLEEP WHILE LYING DOWN TO REST IN THE AFTERNOON WHEN CIRCUMSTANCES PERMIT: 2
HOW LIKELY ARE YOU TO NOD OFF OR FALL ASLEEP WHILE SITTING AND READING: 1

## 2020-09-10 NOTE — PROGRESS NOTES
MD MARIN Gupta Board Certified in Sleep Medicine  Certified [facility]  Board Certified in Neurology [address]  [address] [facility] [address]  [address] [address] [facility] [address], [address]           [address]  [address] [id number]  [phone]    Subjective:     Patient ID: Tip Webber is a 77 y.o. male. Chief Complaint   Patient presents with    Sleep Apnea     NP RYNE       HPI:        Tip Webber is a 77 y.o. male referred by Rosa Olivas for a sleep evaluation. He complains of snoring, feels sleepy during the day, morning headache but he denies snorting, choking, periods of not breathing, knees buckling with laughing, completely or partially paralyzed while falling asleep or waking up, noisy environment, uncomfortable room temperature, uncomfortable bedding. Symptoms began several years ago, gradually worsening since that time. The patient's bed-partner confirmed the snoring without stopped breathing at night  SLEEP SCHEDULE: Goes to bed around 9:30 PM in the weekdays and 9:30 PM in the weekends. It usually takes the patient 15 minutes to fall asleep. The patient gets up 1 per night to go to the bathroom. The Patient finally gets up at 6 AM during the weekdays and 6 AM in the weekends. patient wakes up with morning headache. . the headache usually frontal then at the top and back of his head dull headache lasts few hours. The patient has restless sleep with frequent arousals in addition to the Patient has significant daytime sleepiness. The Patient scored Total score: 5 on [place] Sleepiness Scale ( more than 10 is indicative of daytime sleepiness)and 24 in fatigue scale ( more than 36 is indicative of daytime fatigue). The patient takes daily nap for 45 minutes and usually is  refreshing nap. Previous evaluation and treatment has included- none. DOT/CDL - N/A  EV/'slicense - N/A  The patient HTN is stable. Previous Report(s) Reviewed: historical medical records       Social History     Socioeconomic History    Marital status:      Spouse name: Not on file    Number of children: Not on file    Years of education: Not on file    Highest education level: Not on file   Occupational History    Not on file   Social Needs    Financial resource strain: Not on file    Food insecurity     Worry: Not on file     Inability: Not on file    Transportation needs     Medical: Not on file     Non-medical: Not on file   Tobacco Use    Smoking status: Never Smoker    Smokeless tobacco: Never Used   Substance and Sexual Activity    Alcohol use: Yes     Alcohol/week: 0.0 standard drinks     Frequency: 4 or more times a week     Drinks per session: 1 or 2     Binge frequency: Never     Comment: occasional    Drug use: No    Sexual activity: Not on file   Lifestyle    Physical activity     Days per week: Not on file     Minutes per session: Not on file    Stress: Not on file   Relationships    Social connections     Talks on phone: Not on file     Gets together: Not on file     Attends Islam service: Not on file     Active member of club or organization: Not on file     Attends meetings of clubs or organizations: Not on file     Relationship status: Not on file    Intimate partner violence     Fear of current or ex partner: Not on file     Emotionally abused: Not on file     Physically abused: Not on file     Forced sexual activity: Not on file   Other Topics Concern    Not on file   Social History Narrative    Not on file       Prior to Admission medications    Medication Sig Start Date End Date Taking?  Authorizing Provider   topiramate (TOPAMAX) 50 MG tablet Take 50 mg by mouth 2 times daily   Yes Historical Provider, MD   triamcinolone (KENALOG) 0.1 % ointment Apply to affected areas twice daily for up to 2 weeks or until improved. For eczema.  9/8/20  Yes Phoenix Alfredo MD   atorvastatin (LIPITOR) 10 MG tablet TAKE ONE TABLET BY MOUTH DAILY 7/30/20  Yes Evan Sandhu MD   metoprolol succinate (TOPROL XL) 50 MG extended release tablet TAKE ONE TABLET BY MOUTH DAILY 6/19/20  Yes MARKO Wang - CNP   DULoxetine (CYMBALTA) 30 MG extended release capsule TAKE TWO CAPSULES BY MOUTH DAILY 5/27/20  Yes Evan Sandhu MD   traZODone (DESYREL) 50 MG tablet TAKE ONE TABLET BY MOUTH ONCE NIGHTLY 2/28/20  Yes Evan Sandhu MD   IBUPROFEN PO Take 200-400 mg by mouth 2 times daily as needed   Yes Historical Provider, MD   tamsulosin (FLOMAX) 0.4 MG capsule Take 0.4 mg by mouth daily    Historical Provider, MD   cyclobenzaprine (FLEXERIL) 10 MG tablet TAKE 1 TABLET BY MOUTH THREE TIMES A DAY 1/29/20   Historical Provider, MD   butalbital-acetaminophen-caffeine (FIORICET, [facility]) -40 MG per tablet TAKE ONE TABLET BY MOUTH EVERY 4 HOURS AS NEEDED FOR HEADACHE  Patient not taking: Reported on 9/8/2020 2/11/20   Evan Sandhu MD   LYSINE PO Take 1 tablet by mouth daily     Historical Provider, MD       Allergies as of 09/10/2020 - Review Complete 09/10/2020   Allergen Reaction Noted    Oxycodone  03/25/2019       Patient Active Problem List   Diagnosis    Essential hypertension, benign    Aortic insufficiency    Vitreous degeneration    Hypertrophy of prostate without urinary obstruction and other lower urinary tract symptoms (LUTS)    Irritable bowel syndrome    Anxiety state    Hyperlipidemia    Mitral regurgitation    Prostate cancer ([facility] [address]    Hx of migraine headaches       Past Medical History:   Diagnosis Date    Anxiety state, unspecified     Arthritis 03/2017    Hyperlipidemia     Hypertension     Hypertrophy of prostate without urinary obstruction and other lower urinary tract symptoms (LUTS)     Irritable bowel syndrome     Migraine, unspecified, without mention of intractable migraine without mention of status migrainosus     Mitral valve disorders(424.0)     Other and unspecified hyperlipidemia     Prostate cancer (HCC)     tx with cyber knife radiation    Vitreous degeneration        Past Surgical History:   Procedure Laterality Date    HIP ARTHROPLASTY Right 11/2018    Ottoniel tyler Dr. Red Case Right     x3 due to infection    JOINT REPLACEMENT  02/2019    KNEE ARTHROPLASTY Left 02/2019    Dr. Hernandez Crabtree History   Problem Relation Age of Onset    Hypertension Mother     Heart Disease Mother     Stroke Mother     Heart Attack Father     Diabetes type 2  Brother     Colon Cancer Brother 76    No Known Problems Brother        Review of Systems   Constitutional: Negative. HENT: Negative. Eyes: Negative. Respiratory: Negative. Cardiovascular: Negative for leg swelling. Gastrointestinal: Negative. Endocrine: Negative. Genitourinary: Positive for frequency (one time a night). Musculoskeletal: Positive for arthralgias. Skin: Positive for rash. Allergic/Immunologic: Negative. Neurological: Positive for headaches (AM ). Hematological: Negative. Psychiatric/Behavioral: Positive for dysphoric mood. The patient is nervous/anxious. All other systems reviewed and are negative. Objective:     Vitals:  Weight BMI Neck circumference    Wt Readings from Last 3 Encounters:   09/10/20 190 lb (86.2 kg)   07/09/20 194 lb (88 kg)   03/03/20 187 lb (84.8 kg)    Body mass index is 23.75 kg/m².  Neck circumference: 16     BP HR SaO2   BP Readings from Last 3 Encounters:   09/10/20 124/80   07/09/20 (!) 144/90   03/03/20 (!) 140/80    Pulse Readings from Last 3 Encounters:   09/10/20 70   07/09/20 73   02/14/20 115    SpO2 Readings from Last 3 Encounters:   09/10/20 96%   07/09/20 94%   02/14/20 98%        The mandibular molar Class :   [x]1 []2 []3      Mallampati I Airway Classification: []1 []2 [x]3 []4        Physical Exam  Vitals signs and nursing note reviewed. Constitutional:       Appearance: Normal appearance. HENT:      Head: Atraumatic. Nose: Nose normal.      Comments: Mallampati class 3, no retrognathia or hypognathia , normal airflow in bilateral nostrils, no septum deviation , crowded oropharynx with low soft palate, high arched hard palate,no tonsils enlargement. Eyes:      Extraocular Movements: Extraocular movements intact. Neck:      Musculoskeletal: Normal range of motion and neck supple. Cardiovascular:      Rate and Rhythm: Normal rate and regular rhythm. Heart sounds: Normal heart sounds. Pulmonary:      Effort: Pulmonary effort is normal.      Breath sounds: Normal breath sounds. Musculoskeletal: Normal range of motion. General: No swelling. Skin:     General: Skin is warm. Neurological:      General: No focal deficit present. Psychiatric:         Mood and Affect: Mood normal.         Assessment:   Obstructive sleep apnea especially with snoring, daytime sleepiness, morning headaches, Mallampati class of 3 . Diagnosis Orders   1. Obstructive sleep apnea  Sleep Study with PAP Titration    Baseline Diagnostic Sleep Study   2. Essential hypertension, benign  Sleep Study with PAP Titration     Plan:     Patient was counseled about the pathophysiology of obstructive sleep apnea syndrome and the methods for evaluating its presence and severity. Patient was counseled to avoid driving and other potentially hazardous circumstances if the patient is experiencing excessive sleepiness.   Treatment considerations include the use of nasal CPAP, oral dental appliance or a surgical intervention, which should be based on otolarygologic findings, In the meantime, the patient should be cautioned to avoid the use of alcohol or other depressant medications because of potential for increasing the duration and severity of apnea and cautioned regarding driving or operating and dangerous equipment if the patient is experiencing daytime sleepiness. .      Most likely treating the RYNE will have position impact on HTN control. Orders Placed This Encounter   Procedures    Sleep Study with PAP Titration    Baseline Diagnostic Sleep Study       Return in about 3 months (around 12/10/2020) for to review the PSG and CPAP usage, Reveiwing CPAP usage and compliance report and tro.     Elaine Aj MD  Medical Director - [facility]

## 2020-10-13 RX ORDER — TRAZODONE HYDROCHLORIDE 50 MG/1
TABLET ORAL
Qty: 90 TABLET | Refills: 0 | Status: SHIPPED | OUTPATIENT
Start: 2020-10-13 | End: 2020-12-14

## 2020-11-02 RX ORDER — ZOLPIDEM TARTRATE 5 MG/1
TABLET ORAL
Qty: 30 TABLET | Refills: 2 | Status: SHIPPED | OUTPATIENT
Start: 2020-11-02 | End: 2021-01-19 | Stop reason: SDUPTHER

## 2020-11-24 ENCOUNTER — OFFICE VISIT (OUTPATIENT)
Dept: CARDIOLOGY CLINIC | Age: 75
End: 2020-11-24
Payer: MEDICARE

## 2020-11-24 VITALS
BODY MASS INDEX: 22.63 KG/M2 | SYSTOLIC BLOOD PRESSURE: 110 MMHG | WEIGHT: 182 LBS | HEIGHT: 75 IN | HEART RATE: 74 BPM | OXYGEN SATURATION: 99 % | DIASTOLIC BLOOD PRESSURE: 66 MMHG

## 2020-11-24 PROBLEM — I35.9 AORTIC VALVE DISORDER: Status: ACTIVE | Noted: 2020-11-24

## 2020-11-24 PROCEDURE — 99214 OFFICE O/P EST MOD 30 MIN: CPT | Performed by: INTERNAL MEDICINE

## 2020-11-24 RX ORDER — PREDNISOLONE ACETATE 10 MG/ML
SUSPENSION/ DROPS OPHTHALMIC
COMMUNITY
Start: 2020-11-23 | End: 2021-08-06

## 2020-11-24 NOTE — PROGRESS NOTES
[address]   Cardiac Evaluation      Patient: Oliver Keen  YOB: 1945  Date: 11/24/20     CC: Valvular heart disease     Referring provider: Ralph Fitzgerald MD    History of Present Illness:   Mr. Edd Aguila has a history of hypertension, aortic insufficiency/MR, hyperlipidemia. He has no history of coronary artery disease. He was an  at [employer] [place] [id number] [place]; he is retired. He does not smoke. Today, Mr Edd Aguila states he has been fine. He denies any chest pain, palpitations, santoyo, dizziness, or edema. He walks his labrador every day. January, 2020 he developed headaches. He was diagnosed with large cell arteritis and was on steroids for some time. He also developed a tremor and vertigo. He is following with [address] [place] [address] neurology. Past Medical History:   has a past medical history of Anxiety state, unspecified, Arthritis, Hyperlipidemia, Hypertension, Hypertrophy of prostate without urinary obstruction and other lower urinary tract symptoms (LUTS), Irritable bowel syndrome, Migraine, unspecified, without mention of intractable migraine without mention of status migrainosus, Mitral valve disorders(424.0), Other and unspecified hyperlipidemia, Prostate cancer ([facility] [address] [address] and Vitreous degeneration. Surgical History:   has a past surgical history that includes Tonsillectomy; Hip Arthroplasty (Right, 11/2018); Knee Arthroplasty (Left, 02/2019); Hydrocele surgery (Right); and joint replacement (02/2019).      Current Outpatient Medications   Medication Sig Dispense Refill    zolpidem (AMBIEN) 5 MG tablet TAKE ONE TABLET BY MOUTH EVERY NIGHT AT BEDTIME AS NEEDED FOR SLEEP 30 tablet 2    traZODone (DESYREL) 50 MG tablet TAKE ONE TABLET BY MOUTH ONCE NIGHTLY 90 tablet 0    topiramate (TOPAMAX) 50 MG tablet Take 50 mg by mouth 2 times daily      atorvastatin (LIPITOR) 10 MG tablet TAKE ONE TABLET BY MOUTH DAILY 90 tablet 0    metoprolol succinate (TOPROL XL) 50 MG extended release tablet TAKE ONE TABLET BY MOUTH DAILY 90 tablet 2    DULoxetine (CYMBALTA) 30 MG extended release capsule TAKE TWO CAPSULES BY MOUTH DAILY 60 capsule 11    prednisoLONE acetate (PRED FORTE) 1 % ophthalmic suspension       triamcinolone (KENALOG) 0.1 % ointment Apply to affected areas twice daily for up to 2 weeks or until improved. For eczema. 80 g 2    cyclobenzaprine (FLEXERIL) 10 MG tablet TAKE 1 TABLET BY MOUTH THREE TIMES A DAY      butalbital-acetaminophen-caffeine (FIORICET, ESGIC) -40 MG per tablet TAKE ONE TABLET BY MOUTH EVERY 4 HOURS AS NEEDED FOR HEADACHE (Patient not taking: Reported on 9/8/2020) 180 tablet 1    IBUPROFEN PO Take 200-400 mg by mouth 2 times daily as needed      LYSINE PO Take 1 tablet by mouth daily        No current facility-administered medications for this visit. Social History:  Social History     Socioeconomic History    Marital status:      Spouse name: Ani Gonsalves, writer    Number of children: Not on file    Years of education: Not on file    Highest education level: Not on file       Retired professor   Jenny Roberson strain: Not on file    Food insecurity:     Worry: Not on file     Inability: Not on file   [employer] needs:     Medical: Not on file     Non-medical: Not on file   Tobacco Use    Smoking status: Never Smoker    Smokeless tobacco: Never Used   Substance and Sexual Activity    Alcohol use:  Yes     Alcohol/week: 0.0 standard drinks     Frequency: 4 or more times a week     Drinks per session: 1 or 2     Binge frequency: Never     Comment: occasional    Drug use: No    Sexual activity: Not on file   Lifestyle    Physical activity:     Days per week: Not on file     Minutes per session: Not on file    Stress: Not on file   Relationships    Social connections:     Talks on phone: Not on file     Gets together: Not on file     Attends Scientologist service: Not on file     Active member of club or organization: Not on file     Attends meetings of clubs or organizations: Not on file     Relationship status: Not on file    Intimate partner violence:     Fear of current or ex partner: Not on file     Emotionally abused: Not on file     Physically abused: Not on file     Forced sexual activity: Not on file   Other Topics Concern    Not on file   Social History Narrative    Not on file     Family History:  family history includes Colon Cancer (age of onset: 76) in his brother; Diabetes type 2  in his brother; Heart Attack in his father; Heart Disease in his mother; Hypertension in his mother; No Known Problems in his brother; Stroke in his mother. Allergies:  Oxycodone     Review of Systems:   · Constitutional: there has been no unanticipated weight loss. No change in energy or activity level   · Eyes: No visual changes   · ENT: No Headaches, hearing loss or vertigo. No mouth sores or sore throat. · Cardiovascular: Reviewed in HPI  · Respiratory: No cough or wheezing, no sputum production. · Gastrointestinal: No abdominal pain, appetite loss, blood in stools. No change in bowel or bladder habits. · Genitourinary: No nocturia, dysuria, trouble voiding  · Musculoskeletal:  No gait disturbance, weakness or joint complaints. · Integumentary: No rash or pruritis. · Neurological: No headache, change in muscle strength, numbness or tingling. No change in gait, balance, coordination, mood, affect, memory, mentation, behavior. · Psychiatric: No anxiety or depression  · Endocrine: No malaise or fever  · Hematologic/Lymphatic: No abnormal bruising or bleeding, blood clots or swollen lymph nodes. · Allergic/Immunologic: No nasal congestion or hives. Physical Examination:    Vitals:    11/24/20 0803   BP: 110/66   Site: Left Upper Arm   Position: Sitting   Cuff Size: Medium Adult   Pulse: 74   SpO2: 99%   Weight: 182 lb (82.6 kg)   Height: 6' 3\" (1.905 m)     Body mass index is 22.75 kg/m².      Wt Readings from Last 3 Encounters:   11/24/20 182 lb (82.6 kg)   09/10/20 190 lb (86.2 kg)   07/09/20 194 lb (88 kg)      BP Readings from Last 3 Encounters:   11/24/20 110/66   09/10/20 124/80   07/09/20 (!) 144/90      Constitutional and General Appearance:  appears stated age  Eyes - no xanthelasma  Respiratory:  · Normal excursion and expansion without use of accessory muscles  · Resp Auscultation: Normal breath sounds without dullness  Cardiovascular:  · The apical impulses not displaced  · Heart is regular rate and rhythm with normal S1, S2. 2/6 holosystolic murmur in the left lateral decubitus position, 2/6 BARRON more pronounced when bending over, no clearly audible diastolic murmur. · PMI is normal  · The carotid upstroke is normal, no bruit noted   · JVP is not elevated  · Peripheral pulses are symmetrical  · There is no clubbing, cyanosis of the extremities  · No edema  · No varicosities  · Femoral Arteries: 2+ and equal without bruits  · Pedal Pulses: 2+ and equal   Abdomen:  · No masses or tenderness  · Aorta not palpable  · Normal bowel sounds  Neurological/Psychiatric:  · Alert and oriented x3  · Moves all extremities well  · Exhibits normal gait balance and coordination    Assessment/Plan:  1. Essential hypertension, benign: well controlled    2. Aortic valve insufficiency / Mitral valve insufficiency: Stable  Echo 3/23/18> EF 55%, MVP, mild to moderate MR, mild to moderate AR. 3. Mixed hyperlipidemia: Labs 11/1/19> , , HDL 59, LDL 97. He takes Lipitor 10mg. PLAN:  Repeat lipids and CMP. No med changes. Encouraged to continue regular walks with his dog. FU 1 year. Thank you for allowing to me to participate in the care of Anupam Mackay. Scribe's attestation: This note was scribed in the presence of Dr Ange Blanco MD by Tatyana Gaston RN. The scribe's documentation has been prepared under my direction and personally reviewed by me in its entirety.  I confirm that the note above accurately reflects all work, treatment, procedures, and medical decision making performed by me.

## 2020-12-03 ENCOUNTER — OFFICE VISIT (OUTPATIENT)
Dept: ORTHOPEDIC SURGERY | Age: 75
End: 2020-12-03
Payer: MEDICARE

## 2020-12-03 VITALS — WEIGHT: 185 LBS | HEIGHT: 75 IN | TEMPERATURE: 96.4 F | RESPIRATION RATE: 12 BRPM | BODY MASS INDEX: 23 KG/M2

## 2020-12-03 PROCEDURE — 99204 OFFICE O/P NEW MOD 45 MIN: CPT | Performed by: PHYSICIAN ASSISTANT

## 2020-12-03 NOTE — PROGRESS NOTES
New Patient: SPINE    Referring Provider:  No ref. provider found    Chief Complaint   Patient presents with    Lower Back Pain     NP, LSP. Pain ongoing 1 month but worsened 2 weeks ago. Denies any radiating leg pain but does have some right sided SI joint/hip pain. No recent treatment but has seen an old physical therapist recently for an HEP. No h/o of lumbar spine surgery.  Hip Pain     Right hip pain. Pt had right hip replacement in 2019 by Dr. Carmelo Hwang:      · The patient is being sent at the request of No ref. provider found in consultation as a new spine patient for low back pain and right leg pain. The patient is a 77 y.o. male whom reports symptoms for 1 month. Symptoms progressed over the last  2 weeks. Patient reports there was not a significant event to cause the symptoms. Today discomfort is report at 4 out of 10, describing it as sharp, stabbing. Symptoms are aggravated by: movement, standing, bending. Patient has undergone recent treatment including, recently has been seen by an old physical therapist and has started a home exercise program. Patient denies previous lumbar spine surgery. · The patient presents today as a new patient with lower back and radiating right sided hip and SI joint pain. The patient did have a right hip replacement 2019 by Dr. Hafsa Rosado. The patient describes that more of his pain began on the left side at night when sleeping which he described that it was more of a discomfort then anything. This was over one month ago. He then began PT in [facility] one month ago. Within one week of PT, the patient described that the left sided pain diminished but the lower back right sided pain began more of a debilitating pain and he feels that he may fall due to the instability and pain. This stabbing and sharp pain does come and go but when it comes the pain will be an 8/10 in severity.  The pain is a deep pain with no radiation into the right lower extremity. He does not have numbness or tingling into the lower extremity. Occasionally he will have a radiating pain into the right hip. He has tried Ibuprofen which does help mildly with his pain. Pain Assessment  Location of Pain: Back(LSP)  Location Modifiers: Right, Posterior(Hip)  Severity of Pain: 4  Quality of Pain: Sharp, Other (Comment)(Stabbing)  Duration of Pain: Persistent  Frequency of Pain: Intermittent  Aggravating Factors: Standing, Other (Comment), Bending(Quick Movements; Up and down motions)  Limiting Behavior: Yes  Relieving Factors: Rest  Result of Injury: No  Work-Related Injury: No  Are there other pain locations you wish to document?: No      Associated signs and symptoms:   Neurogenic bowel or bladder symptoms:  no   Perceived weakness:  yes   Difficulty walking:  yes    Recent Imaging (within past one year)   Xrays: no   MRI or CT of spine: no    Current/Past Treatment:   · Physical Therapy:  yes  · Chiropractic:  none  · Injection:  none  · Medications:   NSAIDS:  yes   Muscle relaxer:  Yes, Flexeril    Steriods:  none   Neuropathic medications:  none   Opioids:  none  · Previous surgery:  no  · Previous surgical consult:  no  · Other:  · Infection control  · Tested positive for MRSA in past 12 months:  no  · Tested positive for MSSA \"staph infection\" in past 12 months: no  · Tested positive for VRE (Vancomycin Resistant Enterococci) in past 12 months:   no  · Currently on any antibiotics for an infection: no  · Anticoagulants:  · On a blood thinner:  no   · Any history of bleeding disorder: no   · MRI Contraindication: no   · Previous Pain Management: no       Past medical, surgical, social and family history reviewed with the patient.      Past Medical History:   Past Medical History:   Diagnosis Date    Anxiety state, unspecified     Arthritis 03/2017    Hyperlipidemia     Hypertension     Hypertrophy of prostate without urinary obstruction and other lower urinary tract symptoms (LUTS)     Irritable bowel syndrome     Migraine, unspecified, without mention of intractable migraine without mention of status migrainosus     Mitral valve disorders(424.0)     Other and unspecified hyperlipidemia     Prostate cancer (HCC)     tx with cyber knife radiation    Vitreous degeneration       Past Surgical History:     Past Surgical History:   Procedure Laterality Date    CATARACT REMOVAL Bilateral     HIP ARTHROPLASTY Right 11/2018    Dr. Kimo Xie Right     x3 due to infection    JOINT REPLACEMENT  02/2019    KNEE ARTHROPLASTY Left 02/2019    Dr. Nitza Guerra       Current Medications:     Current Outpatient Medications:     prednisoLONE acetate (PRED FORTE) 1 % ophthalmic suspension, , Disp: , Rfl:     traZODone (DESYREL) 50 MG tablet, TAKE ONE TABLET BY MOUTH ONCE NIGHTLY, Disp: 90 tablet, Rfl: 0    topiramate (TOPAMAX) 50 MG tablet, Take 50 mg by mouth 2 times daily, Disp: , Rfl:     triamcinolone (KENALOG) 0.1 % ointment, Apply to affected areas twice daily for up to 2 weeks or until improved. For eczema. , Disp: 80 g, Rfl: 2    atorvastatin (LIPITOR) 10 MG tablet, TAKE ONE TABLET BY MOUTH DAILY, Disp: 90 tablet, Rfl: 0    metoprolol succinate (TOPROL XL) 50 MG extended release tablet, TAKE ONE TABLET BY MOUTH DAILY, Disp: 90 tablet, Rfl: 2    DULoxetine (CYMBALTA) 30 MG extended release capsule, TAKE TWO CAPSULES BY MOUTH DAILY, Disp: 60 capsule, Rfl: 11    cyclobenzaprine (FLEXERIL) 10 MG tablet, TAKE 1 TABLET BY MOUTH THREE TIMES A DAY, Disp: , Rfl:     butalbital-acetaminophen-caffeine (FIORICET, ESGIC) -40 MG per tablet, TAKE ONE TABLET BY MOUTH EVERY 4 HOURS AS NEEDED FOR HEADACHE, Disp: 180 tablet, Rfl: 1    IBUPROFEN PO, Take 200-400 mg by mouth 2 times daily as needed, Disp: , Rfl:     LYSINE PO, Take 1 tablet by mouth daily , Disp: , Rfl:   Allergies:  Oxycodone  Social History:    reports that he has never smoked. He has never used smokeless tobacco. He reports current alcohol use. He reports that he does not use drugs. Family History:   Family History   Problem Relation Age of Onset    Hypertension Mother     Heart Disease Mother     Stroke Mother     Heart Attack Father     Diabetes type 2  Brother     Colon Cancer Brother 76    No Known Problems Brother        REVIEW OF SYSTEMS: ROS - 14 point    Constitutional: No fevers, chills, night sweats, unexplained weight loss  Eye: No vision changes or diplopia  ENT: No nasal congestion, postnasal drip or sore throat. No tinnitus  Respiratory: No cough or SOB  CV: No chest pain, + palpitations  GI: No nausea, abdominal pain, stool changes  : No dysuria or hematuria  Skin: No new or changing skin lesions, no rashes  MSK: No joint swelling, no morning stiffness, + joint pain, + lower back and right hip pain  Neurological: + headache, but no confusion, no syncope  Psychiatric: + anxiety with no depression  Endocrine: No polyuria or polydipsia  Hematologic: No lymph node enlargement or excessive bleeding  Immunologic:No history of immune deficiency or immunomodulating drugs           PHYSICAL EXAM:    Vitals: Temperature 96.4 °F (35.8 °C), resp. rate 12, height 6' 3\" (1.905 m), weight 185 lb (83.9 kg). GENERAL EXAM:  · General Apparence: Patient is adequately groomed with no evidence of malnutrition. · Psychiatric: Orientation: The patient is oriented to time, place and person. The patient's mood and affect are appropriate   · Vascular: Examination reveals no swelling and palpation reveals no tenderness in upper or lower extremities. Good capillary refill. · The lymphatic examination of the neck, axillae and groin reveals all areas to be without enlargement or induration. · Sensation is intact without deficit in the upper and lower extremities to light touch.    · Coordination of the upper and lower extremities are normal.    CERVICAL various treatment options were outlined and discussed with Fern Thomason including:  Conservative care options: physical therapy, ice, medications, bracing, and activity modification. The indications for therapeutic injections. The indications for additional imaging/laboratory studies. The indications for (possible future) interventions. After considering the various options discussed, Fern Thomason elected to pursue a course of treatment that includes the followin. Medications: Continue anti-inflammatories with appropriate GI Precautions including to stop if develop dark tarry stools or GI upset and to take with food. 2. PT:  Encouraged to continue with Home exercise program.    3. Further studies: Setup Lumbar MR without contrast to evaluate for soft tissue pathology or stenosis contributing to the back pain and paresthesia. The patient has failed a six week trial of a HEP program within the last 3 months. 4. Interventional:  After further imaging is obtained, interventional options will be reviewed and recommended. 5. Healthy Lifestyle Measures:  Patient education material reviewing the following was distributed to Fern Thomason  Anatomic drawings  Healthy lifestyle education  Osteoporosis prevention,   Back and neck pain educational information   Advanced imaging preparedness    Posture education   Proper lifting and carrying techniques,   Weight management  Quitting smoking and   Minor ways to treat back pain  For further information regarding the spine conditions and to review interventional treatments the patient was directed to [employer].    6.  Follow up:  1-2 weeks    Fern Thomason was instructed to call the office if his symptoms worsen or if new symptoms appear prior to the next scheduled visit.  He is specifically instructed to contact the office between now & his scheduled appointment if he has concerns related to his condition or if he needs assistance in scheduling the above tests. He is welcome to call for an appointment sooner if he has any additional concerns or questions. YANELIS Recio, PA-C  Board Certified by the [employer] on Certification of Physician Assistants  Faye Erwin 59  Partner of [facility] [place] ([facility])       This dictation was performed with a verbal recognition program [facility]) and it was checked for errors. It is possible that there are still dictated errors within this office note. If so, please bring any errors to my attention for an addendum. All efforts were made to ensure that this office note is accurate.

## 2020-12-07 ENCOUNTER — TELEPHONE (OUTPATIENT)
Dept: ORTHOPEDIC SURGERY | Age: 75
End: 2020-12-07

## 2020-12-07 NOTE — TELEPHONE ENCOUNTER
S/W PATIENT regarding MRI LSP approval and authorization being valid until 6/2/2021. Patient was instructed to call [address] [place] [address] to schedule MRI LSP, then contact our office for follow up appointment. MRI LSP results will not be given over the phone or via [employer]. Patient currently has a follow up appointment schedule for 12/17/2020. Advised to contact the office if needing to reschedule this to accommodate MRI scan. Patient voiced understanding of MRI results not being given over the phone.

## 2020-12-10 ENCOUNTER — HOSPITAL ENCOUNTER (OUTPATIENT)
Dept: MRI IMAGING | Age: 75
Discharge: HOME | End: 2020-12-10
Payer: MEDICARE

## 2020-12-10 PROCEDURE — 72148 MRI LUMBAR SPINE W/O DYE: CPT

## 2020-12-14 ENCOUNTER — VIRTUAL VISIT (OUTPATIENT)
Dept: ORTHOPEDIC SURGERY | Age: 75
End: 2020-12-14
Payer: MEDICARE

## 2020-12-14 PROCEDURE — 99213 OFFICE O/P EST LOW 20 MIN: CPT | Performed by: PHYSICAL MEDICINE & REHABILITATION

## 2020-12-14 NOTE — PROGRESS NOTES
Follow up: Nathan Phelps  1945  [id number]         Chief Complaint   Patient presents with    Back Problem         HISTORY OF PRESENT ILLNESS:  Mr. Rob Johnson is a 77 y.o. male returns for a follow up visit for multiple medical problems. His current presenting problems are   1. Lumbar radiculitis    2. HNP (herniated nucleus pulposus), lumbar    3. Lumbar stenosis without neurogenic claudication    . As per information/history obtained from the PADT(patient assessment and documentation tool) - He complains of pain in the lower back with radiation to the lower leg Right He rates the pain 7/10 and describes it as shooting. Pain is made worse by: movement. He denies side effects from the current pain regimen. Patient reports that since the last follow up visit the physical functioning is worse, family/social relationships are unchanged, mood is unchanged and sleep patterns are unchanged, and that the overall functioning is worse. Patient denies neurological bowel or bladder. He returns after a MRI of the lumbar spine. He underwent a MRI of the lumbar spine. Associated signs and symptoms:   Neurogenic bowel or bladder symptoms:  no   Perceived weakness:  no   Difficulty walking:  no            Past medical, surgical, social and family history reviewed with the patient.  No pertinent relevant history  Past Medical History:   Past Medical History:   Diagnosis Date    Anxiety state, unspecified     Arthritis 03/2017    Hyperlipidemia     Hypertension     Hypertrophy of prostate without urinary obstruction and other lower urinary tract symptoms (LUTS)     Irritable bowel syndrome     Migraine, unspecified, without mention of intractable migraine without mention of status migrainosus     Mitral valve disorders(424.0)     Other and unspecified hyperlipidemia     Prostate cancer (HCC)     tx with cyber knife radiation    Vitreous degeneration       Past Surgical History: Past Surgical History:   Procedure Laterality Date    CATARACT REMOVAL Bilateral     H[facility]Y [facility] 11/2018    [facility], Dr. Catarina Harden Right     x3 due to infection    JOINT REPLACEMENT  02/2019    KNEE ARTHROPLASTY Left 02/2019    Dr. Benedict London       Current Medications:     Current Outpatient Medications:     traZODone (DESYREL) 50 MG tablet, TAKE ONE TABLET BY MOUTH ONCE NIGHTLY, Disp: 90 tablet, Rfl: 3    atorvastatin (LIPITOR) 10 MG tablet, TAKE ONE TABLET BY MOUTH DAILY, Disp: 90 tablet, Rfl: 3    prednisoLONE acetate (PRED FORTE) 1 % ophthalmic suspension, , Disp: , Rfl:     topiramate (TOPAMAX) 50 MG tablet, Take 50 mg by mouth 2 times daily, Disp: , Rfl:     triamcinolone (KENALOG) 0.1 % ointment, Apply to affected areas twice daily for up to 2 weeks or until improved. For eczema. , Disp: 80 g, Rfl: 2    metoprolol succinate (TOPROL XL) 50 MG extended release tablet, TAKE ONE TABLET BY MOUTH DAILY, Disp: 90 tablet, Rfl: 2    DULoxetine (CYMBALTA) 30 MG extended release capsule, TAKE TWO CAPSULES BY MOUTH DAILY, Disp: 60 capsule, Rfl: 11    cyclobenzaprine (FLEXERIL) 10 MG tablet, TAKE 1 TABLET BY MOUTH THREE TIMES A DAY, Disp: , Rfl:     butalbital-acetaminophen-caffeine (FIORICET, ESGIC) -40 MG per tablet, TAKE ONE TABLET BY MOUTH EVERY 4 HOURS AS NEEDED FOR HEADACHE, Disp: 180 tablet, Rfl: 1    IBUPROFEN PO, Take 200-400 mg by mouth 2 times daily as needed, Disp: , Rfl:     LYSINE PO, Take 1 tablet by mouth daily , Disp: , Rfl:   Allergies:  Oxycodone  Social History:    reports that he has never smoked. He has never used smokeless tobacco. He reports current alcohol use. He reports that he does not use drugs.   Family History:   Family History   Problem Relation Age of Onset    Hypertension Mother     Heart Disease Mother     Stroke Mother     Heart Attack Father     Diabetes type 2  Brother     Colon Cancer Brother 76  No Known Problems Brother        REVIEW OF SYSTEMS:   CONSTITUTIONAL: Denies unexplained weight loss, fevers, chills or fatigue  NEUROLOGICAL: Denies unsteady gait or progressive weakness  MUSCULOSKELETAL: Denies joint swelling or redness  GI: Denies nausea, vomiting, diarrhea   : Denies bowel or bladder issues       PHYSICAL EXAM:    Vitals: Height: 6'3\" 185 lbs,     GENERAL EXAM:  · General Apparence: Patient is adequately groomed with no evidence of malnutrition. · Psychiatric: Orientation: The patient is oriented to time, place and person. The patient's mood and affect are appropriate   · Sensation is intact without deficit in the upper and lower extremities to light touch and pinprick  · Coordination of the upper and lower extremities are normal.    LUMBAR/SACRAL EXAMINATION:  · Inspection: Local inspection shows no step-off or bruising. Lumbar alignment is normal. No instability is noted. · Palpation:   No evidence of tenderness at the midline. Lumbar paraspinal tenderness: Mild L4/5 and L5/S1 tenderness  Bursal tenderness No tenderness bilaterally  There is no paraspinal spasm. · Range of Motion: limited by 25% in all planes due to pain      Diagnostic Testing:    MR Lumbar spine shows    1. At L5-S1, right paracentral disc protrusion impinges on the traversing   right S1 nerve root in the lateral recess.  Moderate left neural foraminal   stenosis at this level. 2. Moderate multifactorial spinal canal stenosis at L1-L2 and L3-L4. 3. Mild multifactorial spinal canal stenosis at L4-L5. 4. Multilevel mild and moderate bilateral neural foraminal stenosis, as   described above. Results for orders placed or performed in visit on 07/07/20   Sedimentation rate, manual   Result Value Ref Range    Sed Rate 1 0 - 20 mm/hr     Impression:       1. Lumbar radiculitis    2. HNP (herniated nucleus pulposus), lumbar    3.  Lumbar stenosis without neurogenic claudication Plan:  Clinical Course: Above diagnoses are worsening    I discussed the diagnosis and the treatment options with Jared Boothe today. In Summary:  The various treatment options were outlined and discussed with Jared Boothe including:  Conservative care options: physical therapy, ice, medications, bracing, and activity modification. The indications for therapeutic injections. The indications for additional imaging/laboratory studies. The indications for (possible future) interventions. After considering the various options discussed, Jared Boothe elected to pursue a course of treatment that includes the followin. Medications:  Continue anti-inflammatories with appropriate GI Precautions including to stop if develop dark tarry stools or GI upset and to take with food. 2. PT:  Encouraged to continue with Home exercise program.    3. Further studies: COVID-19 PCR testing      4. Interventional:  We discussed pursuing a Right L5 and S1 TF epidural steroid injection to address the pain. Radiologic imaging and symptoms confirm the pain etiology. Risks, benefits and alternatives of interventional options were discussed. These include and are not limited to bleeding, infection, spinal headache, nerve injury and lack of pain relief. The patient verbalized understanding and would like to proceed. The patient will be scheduled accordingly. First Epidural steroid injection for therapeutic purposes    As such, I have confirmed the patient has met the general requirements including failure of conservative management including prescription strength analgesics, adjunctive medications were utilized , therapeutic exercise program, and no underlying addiction or behavioral disorders were identified to the ability of the provider. Pursuant to the emergency declaration under the [facility], [facility] waiver authority and the [employer] Act, this Virtual  Visit was conducted, with patient's consent, to reduce the patient's risk of exposure to COVID-19 and provide continuity of care for an established patient. Services were provided through a video synchronous discussion virtually to substitute for in-person clinic visit. We have confirmed that, for purposes of billing, this is a virtual visit with for which we will submit a claim for reimbursement with your insurance company. The patient has accepted responsibility for any copays, coinsurance amounts or other amounts not covered by their insurance company. This visit was completed virtually using doxy. me.

## 2020-12-15 ENCOUNTER — TELEPHONE (OUTPATIENT)
Dept: ORTHOPEDIC SURGERY | Age: 75
End: 2020-12-15

## 2020-12-17 ENCOUNTER — TELEPHONE (OUTPATIENT)
Dept: ORTHOPEDIC SURGERY | Age: 75
End: 2020-12-17

## 2020-12-17 RX ORDER — TIZANIDINE 4 MG/1
4 TABLET ORAL EVERY EVENING
Qty: 30 TABLET | Refills: 0 | Status: SHIPPED | OUTPATIENT
Start: 2020-12-17 | End: 2021-01-16

## 2020-12-17 NOTE — TELEPHONE ENCOUNTER
The patient is requesting to have some kind of medication at night to help with his pain until his procedure. Zanaflex 4 mg 1 po qhs will be sent to the patient's pharmacy.

## 2020-12-18 ENCOUNTER — TELEPHONE (OUTPATIENT)
Dept: ORTHOPEDIC SURGERY | Age: 75
End: 2020-12-18

## 2020-12-18 NOTE — TELEPHONE ENCOUNTER
Auth: # [id number]    Date: 12/18/2020 thru 6/16/2021  Type of SX:  Outpatient GRIS  Location: [facility]  CPT: 92268, 28114  DX Code: M54.16, M51.26, M48.061  SX area: Lumbar spine  Insurance: [employer]    CPT: 52050 [id number], 50676  [address] [facility]

## 2020-12-22 ENCOUNTER — PATIENT MESSAGE (OUTPATIENT)
Dept: DERMATOLOGY | Age: 75
End: 2020-12-22

## 2020-12-30 ENCOUNTER — HOSPITAL ENCOUNTER (OUTPATIENT)
Age: 75
Setting detail: OUTPATIENT SURGERY
Discharge: HOME HEALTH | End: 2020-12-30
Attending: PHYSICAL MEDICINE & REHABILITATION | Admitting: PHYSICAL MEDICINE & REHABILITATION
Payer: MEDICARE

## 2020-12-30 ENCOUNTER — APPOINTMENT (OUTPATIENT)
Dept: GENERAL RADIOLOGY | Age: 75
End: 2020-12-30
Attending: PHYSICAL MEDICINE & REHABILITATION
Payer: MEDICARE

## 2020-12-30 VITALS
OXYGEN SATURATION: 100 % | HEART RATE: 54 BPM | DIASTOLIC BLOOD PRESSURE: 69 MMHG | SYSTOLIC BLOOD PRESSURE: 113 MMHG | TEMPERATURE: 98.6 F | WEIGHT: 185 LBS | RESPIRATION RATE: 16 BRPM | HEIGHT: 75 IN | BODY MASS INDEX: 23 KG/M2

## 2020-12-30 LAB — SARS-COV-2, NAAT: NOT DETECTED

## 2020-12-30 PROCEDURE — 2709999900 HC NON-CHARGEABLE SUPPLY: Performed by: PHYSICAL MEDICINE & REHABILITATION

## 2020-12-30 PROCEDURE — 6360000002 HC RX W HCPCS: Performed by: PHYSICAL MEDICINE & REHABILITATION

## 2020-12-30 PROCEDURE — 2500000003 HC RX 250 WO HCPCS: Performed by: PHYSICAL MEDICINE & REHABILITATION

## 2020-12-30 PROCEDURE — 99152 MOD SED SAME PHYS/QHP 5/>YRS: CPT | Performed by: PHYSICAL MEDICINE & REHABILITATION

## 2020-12-30 PROCEDURE — U0002 COVID-19 LAB TEST NON-CDC: HCPCS

## 2020-12-30 PROCEDURE — 3209999900 FLUORO FOR SURGICAL PROCEDURES

## 2020-12-30 PROCEDURE — 3610000056 HC PAIN LEVEL 4 BASE (NON-OR): Performed by: PHYSICAL MEDICINE & REHABILITATION

## 2020-12-30 RX ORDER — BETAMETHASONE SODIUM PHOSPHATE AND BETAMETHASONE ACETATE 3; 3 MG/ML; MG/ML
INJECTION, SUSPENSION INTRA-ARTICULAR; INTRALESIONAL; INTRAMUSCULAR; SOFT TISSUE
Status: COMPLETED | OUTPATIENT
Start: 2020-12-30 | End: 2020-12-30

## 2020-12-30 RX ORDER — MIDAZOLAM HYDROCHLORIDE 1 MG/ML
INJECTION INTRAMUSCULAR; INTRAVENOUS
Status: COMPLETED | OUTPATIENT
Start: 2020-12-30 | End: 2020-12-30

## 2020-12-30 RX ORDER — LIDOCAINE HYDROCHLORIDE 10 MG/ML
INJECTION, SOLUTION INFILTRATION; PERINEURAL
Status: COMPLETED | OUTPATIENT
Start: 2020-12-30 | End: 2020-12-30

## 2020-12-30 RX ORDER — FENTANYL CITRATE 50 UG/ML
INJECTION, SOLUTION INTRAMUSCULAR; INTRAVENOUS
Status: COMPLETED | OUTPATIENT
Start: 2020-12-30 | End: 2020-12-30

## 2020-12-30 RX ORDER — BUPIVACAINE HYDROCHLORIDE 5 MG/ML
INJECTION, SOLUTION EPIDURAL; INTRACAUDAL
Status: COMPLETED | OUTPATIENT
Start: 2020-12-30 | End: 2020-12-30

## 2020-12-30 ASSESSMENT — PAIN DESCRIPTION - ORIENTATION: ORIENTATION: RIGHT;LOWER

## 2020-12-30 ASSESSMENT — PAIN DESCRIPTION - LOCATION: LOCATION: BACK;BUTTOCKS;LEG

## 2020-12-30 ASSESSMENT — PAIN DESCRIPTION - FREQUENCY: FREQUENCY: CONTINUOUS

## 2020-12-30 ASSESSMENT — PAIN DESCRIPTION - DESCRIPTORS
DESCRIPTORS: SHARP;ACHING
DESCRIPTORS: SHARP

## 2020-12-30 ASSESSMENT — PAIN - FUNCTIONAL ASSESSMENT: PAIN_FUNCTIONAL_ASSESSMENT: 0-10

## 2020-12-30 ASSESSMENT — PAIN SCALES - GENERAL
PAINLEVEL_OUTOF10: 4
PAINLEVEL_OUTOF10: 6

## 2020-12-30 ASSESSMENT — PAIN DESCRIPTION - PAIN TYPE: TYPE: ACUTE PAIN

## 2020-12-30 NOTE — OP NOTE
Patient:  Isis Cadet  YOB: 1945  Medical Record #:  [id number]   Place: [address] [place] [address]  Date:  12/30/2020   Physician:  Elgin Doran MD, NEW    Procedure: 1. Transforaminal Lumbar Epidural Steroid Injection -  right L5  CPT 05980          2. Transforaminal Lumbar Epidural Steroid Injection -  right S1  CPT 49094    Pre-Procedure Diagnosis: Lumbar radiculopathy    GRIS #1    Post-Procedure Diagnosis: Same    Sedation: Local with 1% Lidocaine 3 ml and 1 mg of IV Versed and 50 mcg of IV Fentanyl     EBL: None    Complications: None    Procedure Summary:        The patient was brought to the procedure suite and placed in the prone position. The skin overlying the lumbar spine was prepped and draped in the usual sterile fashion. Using fluoroscopic guidance, the right L5 foramen was identified. Through anesthetized skin, a 22 gauge 3.5 inch curved tip spinal needle was advanced into the foramen. Isovue M 300 was instilled showing an epidurogram/nerve root outline pattern without evidence of vascular or intrathecal spread. Following which, 7.5 mg of Celestone mixed with 1 ml of 0.5% Marcaine was instilled. The needle was removed. Using fluoroscopic guidance, the right S1 foramen was identified. Through anesthetized skin, a 22 gauge 3.5 inch curved tip spinal needle was advanced into the foramen. Isovue M 300 was instilled showing an epidurogram/nerve root outline pattern without evidence of vascular or intrathecal spread. Following which, 7.5 mg of Celestone mixed with 1 ml of 0.5% Marcaine was instilled. The needle was removed and band-aids were applied. The patient was transferred to the post-operative area in stable condition.

## 2020-12-30 NOTE — H&P
HISTORY AND PHYSICAL/PRE-SEDATION ASSESSMENT    Patient:  Valerio Webber   :  1945  Medical Record No.:  [id number]   Date:  2020  Physician:  Charles Benitez M.D. Facility: [address] [place] [address]    HISTORY OF PRESENT ILLNESS:                 The patient is a 77 y.o. male whom presents with lower back and right leg pain. Review of the imaging and physical exam of the patient confirmed the pre-procedure diagnosis. After a thorough discussion of risks, benefits and alternatives informed consent was obtained. Past Medical History:   Past Medical History:   Diagnosis Date    Anxiety state, unspecified     Arthritis 2017    Hyperlipidemia     Hypertension     Hypertrophy of prostate without urinary obstruction and other lower urinary tract symptoms (LUTS)     Irritable bowel syndrome     Migraine, unspecified, without mention of intractable migraine without mention of status migrainosus     Mitral valve disorders(424.0)     Other and unspecified hyperlipidemia     Prostate cancer (HCC)     tx with cyber knife radiation    Vitreous degeneration       Past Surgical History:     Past Surgical History:   Procedure Laterality Date    CATARACT REMOVAL Bilateral     HIP [facility]ROPLASTY Right 2018    [facility], Dr. Estefania Haynes Right     x3 due to infection    JOINT REPLACEMENT  2019    KNEE ARTHROPLASTY Left 2019    Dr. Mariia Burgos       Current Medications:   Prior to Admission medications    Medication Sig Start Date End Date Taking?  Authorizing Provider   tiZANidine (ZANAFLEX) 4 MG tablet Take 1 tablet by mouth every evening 20 Yes LAURIE Escobar   traZODone (DESYREL) 50 MG tablet TAKE ONE TABLET BY MOUTH ONCE NIGHTLY 20  Yes Ron Hunt MD   atorvastatin (LIPITOR) 10 MG tablet TAKE ONE TABLET BY MOUTH DAILY 20  Yes Ron Hunt MD topiramate (TOPAMAX) 50 MG tablet Take 50 mg by mouth 2 times daily   Yes Historical Provider, MD   metoprolol succinate (TOPROL XL) 50 MG extended release tablet TAKE ONE TABLET BY MOUTH DAILY 6/19/20  Yes MARKO Brown - CNP   DULoxetine (CYMBALTA) 30 MG extended release capsule TAKE TWO CAPSULES BY MOUTH DAILY 5/27/20  Yes Phillip Vasquez MD   butalbital-acetaminophen-caffeine (FIORICET, ESGIC) -40 MG per tablet TAKE ONE TABLET BY MOUTH EVERY 4 HOURS AS NEEDED FOR HEADACHE 2/11/20  Yes Phillip Vasquez MD   IBUPROFEN PO Take 200-400 mg by mouth 2 times daily as needed   Yes Historical Provider, MD   triamcinolone (KENALOG) 0.1 % ointment Apply to affected areas twice daily for up to 2 weeks or until improved. For eczema. 12/22/20   Kenya Rosas MD   prednisoLONE acetate (PRED FORTE) 1 % ophthalmic suspension  11/23/20   Historical Provider, MD   cyclobenzaprine (FLEXERIL) 10 MG tablet TAKE 1 TABLET BY MOUTH THREE TIMES A DAY 1/29/20   Historical Provider, MD   LYSINE PO Take 1 tablet by mouth daily     Historical Provider, MD     Allergies:  Oxycodone  Social History:    reports that he has never smoked. He has never used smokeless tobacco. He reports current alcohol use. He reports that he does not use drugs. Family History:   Family History   Problem Relation Age of Onset    Hypertension Mother     Heart Disease Mother     Stroke Mother     Heart Attack Father     Diabetes type 2  Brother     Colon Cancer Brother 76    No Known Problems Brother        Vitals: Blood pressure 120/79, pulse 58, temperature 98.6 °F (37 °C), temperature source Temporal, resp. rate 16, height 6' 3\" (1.905 m), weight 185 lb (83.9 kg), SpO2 100 %. PHYSICAL EXAM:including affected areas  Cardiovascular: Normal rate, regular rhythm, normal heart sounds. Pulmonary/Chest: No wheezes.    Extremities: Moves all extremities equally  Cervical and Lumbar Spine: Painful range of motion, no midline tenderness Diagnosis:Lumbar radiculopathy  M54.16   M51.26  M48.061    Plan: Proceed with planned procedure      ASA CLASS:         []   I. Normal, healthy adult           [x]   II.  Mild systemic disease            []   III. Severe systemic disease      Mallampati: Mallampati Class II - (soft palate, fauces & uvula are visible)      Sedation plan:   [x]  Local              []  Minimal                  []  General anesthesia    Patient's condition acceptable for planned procedure/sedation. Post Procedure Plan   Return to same level of care   ______________________     The patient was counseled at length about the risks of jenna Covid-19 in the yonas-operative and post-operative states including the recovery window of their procedure. The patient was made aware that jenna Covid-19 after a surgical procedure may worsen their prognosis for recovering from the virus and lend to a higher morbidity and or mortality risk. The patient was given the options of postponing their procedure. All of the risks, benefits, and alternatives were discussed. The patient does wish to proceed with the procedure. The risks and benefits as well as alternatives to the procedure have been discussed with the patient and or family. The patient and or next of kin understands and agrees to proceed.     Alyse Hudson M.D.

## 2020-12-30 NOTE — PROGRESS NOTES
Pt states pain is now 2. He stood with less discomfort and states he feels ready to go  Home. Dr. Reginaldo Harmon visited pt and gave him a prescription. IV discontinued, catheter intact, and dressing applied. Procedural dressing dry and intact. Bilateral lower extremities equal in strength. Discharge instructions reviewed with patient or responsible adult, signed and copy given. All home medications have been reviewed. All questions answered and patient or responsible adult verbalized understanding.   PAIN LEVEL AT DISCHARGE _2____
Pt stood up to see if he felt well. He had instant terrible pain. He is now sitting and pain level goes down until he moves again. Dr. Trinh Beth visited him a few minutes ago before this pain started.
Teaching / education initiated regarding perioperative experience, expectations, and pain management during stay. Patient verbalized understanding.
THE GOAL BLOOD SUGAR THE AM OF PROCEDURE  OR LESS ABOVE THAT THE PROCEDURE MAY BE CANCELLED  ANY QUESTIONS CALL YOUR DOCTOR. ALSO,PLEASE READ THE INSTRUCTION PACKET FROM YOUR DR IF YOU RECEIVED ONE.   SPINE INTERVENTION NUMBER [id number]

## 2021-01-13 ENCOUNTER — VIRTUAL VISIT (OUTPATIENT)
Dept: ORTHOPEDIC SURGERY | Age: 76
End: 2021-01-13
Payer: MEDICARE

## 2021-01-13 DIAGNOSIS — M54.16 LUMBAR RADICULITIS: Primary | ICD-10-CM

## 2021-01-13 DIAGNOSIS — I10 ESSENTIAL HYPERTENSION, BENIGN: Chronic | ICD-10-CM

## 2021-01-13 DIAGNOSIS — M48.061 LUMBAR STENOSIS WITHOUT NEUROGENIC CLAUDICATION: ICD-10-CM

## 2021-01-13 DIAGNOSIS — M51.26 HNP (HERNIATED NUCLEUS PULPOSUS), LUMBAR: ICD-10-CM

## 2021-01-13 PROCEDURE — 99213 OFFICE O/P EST LOW 20 MIN: CPT | Performed by: STUDENT IN AN ORGANIZED HEALTH CARE EDUCATION/TRAINING PROGRAM

## 2021-01-13 RX ORDER — MELOXICAM 15 MG/1
15 TABLET ORAL DAILY PRN
Qty: 30 TABLET | Refills: 5 | Status: SHIPPED | OUTPATIENT
Start: 2021-01-13 | End: 2021-08-06

## 2021-01-13 NOTE — PROGRESS NOTES
Follow up: [facility] visit (Audio/visual connection present)    Jm Paige  1945  [id number]         Chief Complaint   Patient presents with    Lower Back Pain     12/30/2020 R L5 + S1 TF          HISTORY OF PRESENT ILLNESS:  Mr. Juana Villegas is a 77 y.o. male returns for a follow up visit for multiple medical problems. His current presenting problems are   1. Lumbar radiculitis    2. HNP (herniated nucleus pulposus), lumbar    3. Lumbar stenosis without neurogenic claudication    . As per information/history obtained from the PADT(patient assessment and documentation tool) - He complains of pain in the lower back with radiation to the lower leg Right He rates the pain 1/10 and describes it as stiffness in the morning. Pain is made worse by: worse in the morning. He has itching side effects from the current pain regimen. Patient reports that since the last follow up visit the physical functioning is better, family/social relationships are unchanged, mood is better and sleep patterns are better, and that the overall functioning is better. Patient denies neurological bowel or bladder. Patient underwent a transforaminal lumbar GRIS right L5 and S1 on 12/30/2020. The patient reports 70% improvement of symptoms. The sharp pain has decreased. He feels that he has been able to increase his activity levels since the procedure. Patient denies use of ambulatory aids at this time. The patient also reports being able to discontinue all medication for his pain levels. He does describe some stiffness with sitting or driving for more than an hour. He took a couple Tramadol tablets the day of the procedure upon arriving home, but nothing since that time. The patient also takes ibuprofen as needed however he has been able to decrease this significantly.       Associated signs and symptoms:   Neurogenic bowel or bladder symptoms:  no   Perceived weakness:  no   Difficulty walking:  no            Past medical, surgical, social and family history reviewed with the patient. No pertinent relevant history  Past Medical History:   Past Medical History:   Diagnosis Date    Anxiety state, unspecified     Arthritis 03/2017    Hyperlipidemia     Hypertension     Hypertrophy of prostate without urinary obstruction and other lower urinary tract symptoms (LUTS)     Irritable bowel syndrome     Migraine, unspecified, without mention of intractable migraine without mention of status migrainosus     Mitral valve disorders(424.0)     Other and unspecified hyperlipidemia     Prostate cancer (HCC)     tx with cyber knife radiation    Vitreous degeneration       Past Surgical History:     Past Surgical History:   Procedure Laterality Date    CATARACT REMOVAL Bilateral     HIP ARTHROPLASTY Right 11/2018    [facility] [place], Dr. Michelle Almanza Right     x3 due to infection    JOINT REPLACEMENT  02/2019    KNEE ARTHROPLASTY Left 02/2019    Dr. Man Barclay Right 12/30/2020    RIGHT L5 AND S1 TRANSFORAMINAL EPIDURAL STEROID INJECTION WITH FLUOROSCOPYR performed by Katalina Stephenson MD at [address] [place] [address]       Current Medications:     Current Outpatient Medications:     triamcinolone (KENALOG) 0.1 % ointment, Apply to affected areas twice daily for up to 2 weeks or until improved. For eczema. , Disp: 450 g, Rfl: 1    tiZANidine (ZANAFLEX) 4 MG tablet, Take 1 tablet by mouth every evening, Disp: 30 tablet, Rfl: 0    traZODone (DESYREL) 50 MG tablet, TAKE ONE TABLET BY MOUTH ONCE NIGHTLY, Disp: 90 tablet, Rfl: 3    atorvastatin (LIPITOR) 10 MG tablet, TAKE ONE TABLET BY MOUTH DAILY, Disp: 90 tablet, Rfl: 3    prednisoLONE acetate (PRED FORTE) 1 % ophthalmic suspension, , Disp: , Rfl:     topiramate (TOPAMAX) 50 MG tablet, Take 50 mg by mouth 2 times daily, Disp: , Rfl:     metoprolol succinate (TOPROL XL) 50 MG extended release tablet, TAKE ONE TABLET BY MOUTH DAILY, Disp: 90 tablet, Rfl: 2    DULoxetine (CYMBALTA) 30 MG extended release capsule, TAKE TWO CAPSULES BY MOUTH DAILY, Disp: 60 capsule, Rfl: 11    cyclobenzaprine (FLEXERIL) 10 MG tablet, TAKE 1 TABLET BY MOUTH THREE TIMES A DAY, Disp: , Rfl:     butalbital-acetaminophen-caffeine (FIORICET, ESGIC) -40 MG per tablet, TAKE ONE TABLET BY MOUTH EVERY 4 HOURS AS NEEDED FOR HEADACHE, Disp: 180 tablet, Rfl: 1    IBUPROFEN PO, Take 200-400 mg by mouth 2 times daily as needed, Disp: , Rfl:     LYSINE PO, Take 1 tablet by mouth daily , Disp: , Rfl:   Allergies:  Oxycodone and Wound dressing adhesive  Social History:    reports that he has never smoked. He has never used smokeless tobacco. He reports current alcohol use. He reports that he does not use drugs. Family History:   Family History   Problem Relation Age of Onset    Hypertension Mother     Heart Disease Mother     Stroke Mother     Heart Attack Father     Diabetes type 2  Brother     Colon Cancer Brother 76    No Known Problems Brother        REVIEW OF SYSTEMS:   CONSTITUTIONAL: Denies unexplained weight loss, fevers, chills or fatigue  NEUROLOGICAL: Denies unsteady gait or progressive weakness  MUSCULOSKELETAL: Denies joint swelling or redness  GI: Denies nausea, vomiting, diarrhea   : Denies bowel or bladder issues       PHYSICAL EXAM:  Limitations present due to Telehealth  Vitals:   Patient-Reported Vitals 1/13/2021   Patient-Reported Weight 83.9kg   Patient-Reported Height 1.905m   Patient-Reported Pulse 72        GENERAL EXAM:  · General Apparence: Patient is adequately groomed with no evidence of malnutrition. · Psychiatric: Orientation: The patient is oriented to time, place and person. The patient's mood and affect are appropriate   · Vascular: Examination reveals no swelling and palpation reveals no tenderness in upper or lower extremities. Good capillary refill.    · The lymphatic examination of the neck, axillae and groin reveals all areas to be without enlargement or induration   Sensation is intact without deficit in the upper and lower extremities to light touch   · Coordination of the upper and lower extremities are normal.  · RIGHT UPPER EXTREMITY:  Inspection/examination of the right upper extremity does not show any tenderness, deformity or injury. Range of motion is normal and pain-free. There is no gross instability. There are no rashes, ulcerations or lesions. Strength and tone are normal. No atrophy or abnormal movements are noted. · LEFT UPPER EXTREMITY: Inspection/examination of the left upper extremity does not show any tenderness, deformity or injury. Range of motion is normal and pain-free. There is no gross instability. There are no rashes, ulcerations or lesions. Strength and tone are normal. No atrophy or abnormal movements are noted. LUMBAR/SACRAL EXAMINATION:  · Inspection:  Lumbar alignment is normal.   · Range of Motion: limited by 25% in all planes due to pain  · Strength:   Strength testing is at least 4/5 in all muscle groups tested based on visual exam  · Special Tests:   Straight leg raise and crossed SLR negative. · Skin: There are no rashes, ulcerations or lesions. · Gait & station: normal, patient ambulates without assistance and no ataxia  · Additional Examinations:  · RIGHT LOWER EXTREMITY: Inspection/examination of the right lower extremity does not show any tenderness, deformity or injury. Range of motion is unremarkable. There is no gross instability. There are no rashes, ulcerations or lesions. Strength and tone are normal. No atrophy or abnormal movements are noted. · LEFT LOWER EXTREMITY:  Inspection/examination of the left lower extremity does not show any tenderness, deformity or injury. Range of motion is unremarkable. There is no gross instability. There are no rashes, ulcerations or lesions. Strength and tone are normal. No atrophy or abnormal movements are noted.         Diagnostic Testing:    No new diagnostics  Results for orders placed or performed during the hospital encounter of 20   COVID-19   Result Value Ref Range    SARS-CoV-2, NAAT Not Detected Not Detected     Impression:       1. Lumbar radiculitis    2. HNP (herniated nucleus pulposus), lumbar    3. Lumbar stenosis without neurogenic claudication        Plan:  Clinical Course: Above diagnoses are improving     I discussed the diagnosis and the treatment options with Caroline Ramos today. In Summary:  The various treatment options were outlined and discussed with Caroline Ramos including:  Conservative care options: physical therapy, ice, medications, bracing, and activity modification. The indications for therapeutic injections. The indications for additional imaging/laboratory studies. The indications for (possible future) interventions. After considering the various options discussed, Caroline Ramos elected to pursue a course of treatment that includes the followin. Medications:  I will prescribe Meloxicam 15 mg PO daily to help with inflammation and reduce pain. CV, GI and Nephro risks were reviewed. 2. PT:  Encouraged to continue with Home exercise program.    3. Further studies: No further studies. 4. Interventional:  70% improvement following right L5 and S1 transforaminal epidural steroid injection. 5. Follow up:  2-3 months      Caroline Ramos was instructed to call the office if his symptoms worsen or if new symptoms appear prior to the next scheduled visit. He is specifically instructed to contact the office between now & his scheduled appointment if he has concerns related to his condition or if he needs assistance in scheduling the above tests. He is welcome to call for an appointment sooner if he has any additional concerns or questions.        Martine CEDILLO,  personally performed the services described in this documentation as scribed by HERNESTO Kent in my presence and it is both

## 2021-01-14 RX ORDER — METOPROLOL SUCCINATE 50 MG/1
TABLET, EXTENDED RELEASE ORAL
Qty: 90 TABLET | Refills: 1 | Status: SHIPPED | OUTPATIENT
Start: 2021-01-14 | End: 2021-08-16

## 2021-01-14 NOTE — TELEPHONE ENCOUNTER
Medication:   Requested Prescriptions     Pending Prescriptions Disp Refills    metoprolol succinate (TOPROL XL) 50 MG extended release tablet [Pharmacy Med Name: METOPROLOL SUCC ER 50 MG TAB] 90 tablet 1     Sig: TAKE ONE TABLET BY MOUTH DAILY          Patient Phone Number: [phone] (home)     Last appt: 7/9/2020   Next appt: Visit date not found    Last OARRS: No flowsheet data found.   PDMP Monitoring:    Last PDMP Ezequiel Rosales as Reviewed [facility]):  Review User Review Instant Review Result          Preferred Pharmacy:   [address] [facility] [phone] - F [phone]  [facility] 167  Phone: [phone] Fax: [phone]    [facility] [place] [phone] - f [phone]  [address] [address]  Phone: [phone] Fax: [phone][id number] - [address] [facility] [address]  225 [facility] [address]  Phone: [phone] Fax: [phone]    [facility] [address] [facility] [address] [place]  [phone] Alysha Mcdonald [phone]  [facility] [address] [address]  Phone: [phone] Fax: [phone]

## 2021-01-18 ENCOUNTER — PATIENT MESSAGE (OUTPATIENT)
Dept: FAMILY MEDICINE CLINIC | Age: 76
End: 2021-01-18

## 2021-01-18 DIAGNOSIS — G47.00 INSOMNIA, UNSPECIFIED TYPE: ICD-10-CM

## 2021-01-18 NOTE — TELEPHONE ENCOUNTER
From: Justus Dodson  To: Sj Lynch MD  Sent: 1/18/2021 11:40 AM EST  Subject: Prescription Question    I'm leaving for [place] for 6 weeks on Thurs, and I have one refill left on a prescription for Ambien which I can't get refilled until Thusday morning. Could Dr. Lalit Wolfe call the [facility] in [facility] and approve my getting this refilled on Tues or Wed? Thanks.   Effie Ohara

## 2021-01-19 RX ORDER — ZOLPIDEM TARTRATE 5 MG/1
TABLET ORAL
Qty: 30 TABLET | Refills: 2 | Status: SHIPPED | OUTPATIENT
Start: 2021-01-19 | End: 2021-11-02

## 2021-05-25 RX ORDER — BUTALBITAL, ACETAMINOPHEN AND CAFFEINE 50; 325; 40 MG/1; MG/1; MG/1
TABLET ORAL
Qty: 180 TABLET | Refills: 0 | Status: SHIPPED | OUTPATIENT
Start: 2021-05-25 | End: 2021-08-06

## 2021-05-25 NOTE — TELEPHONE ENCOUNTER
Medication:   Requested Prescriptions     Pending Prescriptions Disp Refills    butalbital-acetaminophen-caffeine (FIORICET, ESGIC) -40 MG per tablet [Pharmacy Med Name: BUTALBITAL-ACETAMN-CAF -40 TB] 180 tablet 0     Sig: TAKE ONE TABLET BY MOUTH EVERY 4 HOURS AS NEEDED FOR HEADACHE      Last Filled:  2/11/20    Patient Phone Number: [phone] (home)     Last appt: 7/9/2020   Next appt: Visit date not found    Last OARRS: No flowsheet data found.   PDMP Monitoring:    Last PDMP Mark Chao as Reviewed [facility]):  Review User Review Instant Review Result          Preferred Pharmacy:   [facility] [address] [place] [facility] [phone] - F [phone]  [address] [place], [place] [address]  Phone: [phone] Fax: [phone]    [place] [address] [facility] Delivery - [facility] [phone] - F [phone]  [address] [place] [address]  Phone: [phone] Fax: [id number] - [id number] [address] [place] [address] [address]  [address] [place] [address]  Phone: [phone] Fax: [phone]    [facility] [address] [facility] [address] [phone] Lilo Galdamez [phone]  [address] [address]  Phone: [phone] Fax: [phone]

## 2021-07-17 DIAGNOSIS — F32.A DEPRESSION, UNSPECIFIED DEPRESSION TYPE: ICD-10-CM

## 2021-07-17 DIAGNOSIS — F41.9 ANXIETY: ICD-10-CM

## 2021-07-19 RX ORDER — DULOXETIN HYDROCHLORIDE 30 MG/1
CAPSULE, DELAYED RELEASE ORAL
Qty: 60 CAPSULE | Refills: 0 | Status: SHIPPED
Start: 2021-07-19 | End: 2021-08-06 | Stop reason: ALTCHOICE

## 2021-07-19 NOTE — TELEPHONE ENCOUNTER
Medication:   Requested Prescriptions     Pending Prescriptions Disp Refills    DULoxetine (CYMBALTA) 30 MG extended release capsule [Pharmacy Med Name: DULoxetine HCL DR 30 MG CAPSULE] 180 capsule 10     Sig: TAKE TWO CAPSULES BY MOUTH DAILY      Provider out of office    Patient Phone Number: [phone] (home)     Last appt: 7/9/2020   Next appt: Visit date not found    Last OARRS: No flowsheet data found.   PDMP Monitoring:    Last PDMP [facility] as Reviewed [facility]):  Review User Review Instant Review Result          Preferred Pharmacy:   [facility] [address], [place] - [place] [phone] - F [phone]  [facility] 167  Phone: [phone] Fax: [phone]    [facility] - [place] [phone] - F [phone]  18 [facility] [address]  Phone: [phone] Fax: [id number] - [address], [address]  [address]  Phone: [phone] Fax: [phone]    [address] [place]  [phone] Cayla Jason [phone]  [facility] [address] [address]  Phone: [phone] Fax: [phone]

## 2021-07-30 ASSESSMENT — PATIENT HEALTH QUESTIONNAIRE - PHQ9
SUM OF ALL RESPONSES TO PHQ9 QUESTIONS 1 & 2: 1
2. FEELING DOWN, DEPRESSED OR HOPELESS: 1
SUM OF ALL RESPONSES TO PHQ QUESTIONS 1-9: 1
1. LITTLE INTEREST OR PLEASURE IN DOING THINGS: 0
SUM OF ALL RESPONSES TO PHQ QUESTIONS 1-9: 1
SUM OF ALL RESPONSES TO PHQ QUESTIONS 1-9: 1

## 2021-07-30 ASSESSMENT — LIFESTYLE VARIABLES
HOW OFTEN DURING THE LAST YEAR HAVE YOU BEEN UNABLE TO REMEMBER WHAT HAPPENED THE NIGHT BEFORE BECAUSE YOU HAD BEEN DRINKING: 0
HOW OFTEN DURING THE LAST YEAR HAVE YOU FOUND THAT YOU WERE NOT ABLE TO STOP DRINKING ONCE YOU HAD STARTED: 0
AUDIT TOTAL SCORE: 5
HOW OFTEN DURING THE LAST YEAR HAVE YOU FAILED TO DO WHAT WAS NORMALLY EXPECTED FROM YOU BECAUSE OF DRINKING: 0
HOW OFTEN DURING THE LAST YEAR HAVE YOU FAILED TO DO WHAT WAS NORMALLY EXPECTED FROM YOU BECAUSE OF DRINKING: NEVER
HOW OFTEN DO YOU HAVE SIX OR MORE DRINKS ON ONE OCCASION: 1
HOW OFTEN DO YOU HAVE SIX OR MORE DRINKS ON ONE OCCASION: LESS THAN MONTHLY
HAS A RELATIVE, FRIEND, DOCTOR, OR ANOTHER HEALTH PROFESSIONAL EXPRESSED CONCERN ABOUT YOUR DRINKING OR SUGGESTED YOU CUT DOWN: NO
HOW OFTEN DO YOU HAVE A DRINK CONTAINING ALCOHOL: 4
AUDIT TOTAL SCORE: 0
AUDIT-C TOTAL SCORE: 0
HOW OFTEN DURING THE LAST YEAR HAVE YOU NEEDED AN ALCOHOLIC DRINK FIRST THING IN THE MORNING TO GET YOURSELF GOING AFTER A NIGHT OF HEAVY DRINKING: 0
HOW OFTEN DURING THE LAST YEAR HAVE YOU HAD A FEELING OF GUILT OR REMORSE AFTER DRINKING: NEVER
HOW OFTEN DURING THE LAST YEAR HAVE YOU HAD A FEELING OF GUILT OR REMORSE AFTER DRINKING: 0
HOW MANY STANDARD DRINKS CONTAINING ALCOHOL DO YOU HAVE ON A TYPICAL DAY: 0
HAVE YOU OR SOMEONE ELSE BEEN INJURED AS A RESULT OF YOUR DRINKING: 0
HOW OFTEN DURING THE LAST YEAR HAVE YOU NEEDED AN ALCOHOLIC DRINK FIRST THING IN THE MORNING TO GET YOURSELF GOING AFTER A NIGHT OF HEAVY DRINKING: NEVER
HOW OFTEN DURING THE LAST YEAR HAVE YOU FOUND THAT YOU WERE NOT ABLE TO STOP DRINKING ONCE YOU HAD STARTED: NEVER
AUDIT-C TOTAL SCORE: 5
HOW OFTEN DURING THE LAST YEAR HAVE YOU BEEN UNABLE TO REMEMBER WHAT HAPPENED THE NIGHT BEFORE BECAUSE YOU HAD BEEN DRINKING: NEVER
HOW MANY STANDARD DRINKS CONTAINING ALCOHOL DO YOU HAVE ON A TYPICAL DAY: ONE OR TWO
HOW OFTEN DO YOU HAVE A DRINK CONTAINING ALCOHOL: FOUR OR MORE TIMES A WEEK
HAVE YOU OR SOMEONE ELSE BEEN INJURED AS A RESULT OF YOUR DRINKING: NO
HAS A RELATIVE, FRIEND, DOCTOR, OR ANOTHER HEALTH PROFESSIONAL EXPRESSED CONCERN ABOUT YOUR DRINKING OR SUGGESTED YOU CUT DOWN: 0

## 2021-08-06 ENCOUNTER — OFFICE VISIT (OUTPATIENT)
Dept: FAMILY MEDICINE CLINIC | Age: 76
End: 2021-08-06
Payer: MEDICARE

## 2021-08-06 VITALS
SYSTOLIC BLOOD PRESSURE: 146 MMHG | HEART RATE: 62 BPM | TEMPERATURE: 96.9 F | OXYGEN SATURATION: 98 % | HEIGHT: 73 IN | BODY MASS INDEX: 25.34 KG/M2 | DIASTOLIC BLOOD PRESSURE: 82 MMHG | WEIGHT: 191.2 LBS

## 2021-08-06 DIAGNOSIS — G43.109 MIGRAINE WITH AURA AND WITHOUT STATUS MIGRAINOSUS, NOT INTRACTABLE: ICD-10-CM

## 2021-08-06 DIAGNOSIS — R03.0 ELEVATED BP WITHOUT DIAGNOSIS OF HYPERTENSION: ICD-10-CM

## 2021-08-06 DIAGNOSIS — Z00.00 WELL ADULT EXAM: Primary | ICD-10-CM

## 2021-08-06 DIAGNOSIS — C61 PROSTATE CANCER (HCC): ICD-10-CM

## 2021-08-06 DIAGNOSIS — F41.1 ANXIETY STATE: ICD-10-CM

## 2021-08-06 PROBLEM — G43.009 MIGRAINE WITHOUT AURA AND WITHOUT STATUS MIGRAINOSUS, NOT INTRACTABLE: Status: ACTIVE | Noted: 2021-08-06

## 2021-08-06 PROBLEM — M31.6 TEMPORAL ARTERITIS (HCC): Status: ACTIVE | Noted: 2021-08-06

## 2021-08-06 PROBLEM — M31.6 TEMPORAL ARTERITIS (HCC): Status: RESOLVED | Noted: 2021-08-06 | Resolved: 2021-08-06

## 2021-08-06 PROCEDURE — G0439 PPPS, SUBSEQ VISIT: HCPCS | Performed by: FAMILY MEDICINE

## 2021-08-06 RX ORDER — UBROGEPANT 50 MG/1
50 TABLET ORAL PRN
COMMUNITY
End: 2022-01-04

## 2021-08-06 RX ORDER — BUTALBITAL, ACETAMINOPHEN AND CAFFEINE 50; 325; 40 MG/1; MG/1; MG/1
1 TABLET ORAL EVERY 4 HOURS PRN
Qty: 60 TABLET | Refills: 1
Start: 2021-08-06 | End: 2022-02-18 | Stop reason: SDUPTHER

## 2021-08-06 RX ORDER — ESCITALOPRAM OXALATE 20 MG/1
20 TABLET ORAL DAILY
COMMUNITY
End: 2022-02-18 | Stop reason: SDUPTHER

## 2021-08-06 NOTE — PATIENT INSTRUCTIONS
Send me copy of covid vaccination. Well Visit, Over 72: Care Instructions  Overview     Well visits can help you stay healthy. Your doctor has checked your overall health and may have suggested ways to take good care of yourself. Your doctor also may have recommended tests. At home, you can help prevent illness with healthy eating, regular exercise, and other steps. Follow-up care is a key part of your treatment and safety. Be sure to make and go to all appointments, and call your doctor if you are having problems. It's also a good idea to know your test results and keep a list of the medicines you take. How can you care for yourself at home? · Get screening tests that you and your doctor decide on. Screening helps find diseases before any symptoms appear. · Eat healthy foods. Choose fruits, vegetables, whole grains, protein, and low-fat dairy foods. Limit fat, especially saturated fat. Reduce salt in your diet. · Limit alcohol. If you are a man, have no more than 2 drinks a day or 14 drinks a week. If you are a woman, have no more than 1 drink a day or 7 drinks a week. Since alcohol affects older adults differently, you may want to limit alcohol even more. Or you may not want to drink at all. · Get at least 30 minutes of exercise on most days of the week. Walking is a good choice. You also may want to do other activities, such as running, swimming, cycling, or playing tennis or team sports. · Reach and stay at a healthy weight. This will lower your risk for many problems, such as obesity, diabetes, heart disease, and high blood pressure. · Do not smoke. Smoking can make health problems worse. If you need help quitting, talk to your doctor about stop-smoking programs and medicines. These can increase your chances of quitting for good. · Care for your mental health. It is easy to get weighed down by worry and stress.  Learn strategies to manage stress, like deep breathing and mindfulness, and stay connected with your family and community. If you find you often feel sad or hopeless, talk with your doctor. Treatment can help. · Talk to your doctor about whether you have any risk factors for sexually transmitted infections (STIs). You can help prevent STIs if you wait to have sex with a new partner (or partners) until you've each been tested for STIs. It also helps if you use condoms (male or female condoms) and if you limit your sex partners to one person who only has sex with you. Vaccines are available for some STIs. · If you think you may have a problem with alcohol or drug use, talk to your doctor. This includes prescription medicines (such as amphetamines and opioids) and illegal drugs (such as cocaine and methamphetamine). Your doctor can help you figure out what type of treatment is best for you. · Protect your skin from too much sun. When you're outdoors from 10 a.m. to 4 p.m., stay in the shade or cover up with clothing and a hat with a wide brim. Wear sunglasses that block UV rays. Even when it's cloudy, put broad-spectrum sunscreen (SPF 30 or higher) on any exposed skin. · See a dentist one or two times a year for checkups and to have your teeth cleaned. · Wear a seat belt in the car. When should you call for help? Watch closely for changes in your health, and be sure to contact your doctor if you have any problems or symptoms that concern you. Where can you learn more? Go to https://TeachersMeet.compatrica.health-partners. org and sign in to your Code Climate account. Enter A332 in the [facility] box to learn more about \"Well Visit, Over 65: Care Instructions. \"     If you do not have an account, please click on the \"Sign Up Now\" link. Current as of: May 27, 2020               Content Version: 12.9  © 6030-9406 Healthwise, Incorporated. Care instructions adapted under license by [facility] ([facility]).  If you have questions about a medical condition or this instruction, always ask your healthcare professional. [address] any warranty or liability for your use of this information.        Monitor blood pressure 2-3 times a week and drop off readings for review in 1 month    Goal BP is <140/80 for many people, good control is <130/80

## 2021-08-06 NOTE — PROGRESS NOTES
[facility] VISIT    Patient is here for their Medicare Annual Wellness Visit no concerns    Last eye exam: 4 months ago  Last dental exam: every 6 months  Exercise: walking everyday  Diet: in general, a \"healthy\" diet  , on average, 3 meals per day    How would you rate your overall health? : Very good        Fall Risk 7/30/2021 2/21/2019 7/17/2018   2 or more falls in past year? no no no   Fall with injury in past year? no no no       PHQ Scores 7/30/2021 2/21/2019 7/17/2018 7/17/2018   PHQ2 Score 1 0 1 0   PHQ9 Score 1 0 1 0       Do you always wear a seat belt in the car?: Yes      Have you noted any problems with hearing?: No  Have you noted any vision problems?: No  Do you have concerns about your sexual health?: no  In the past month how much has pain been an issue for you?:  Somewhat  In the past month have you had issues with anxiety, loneliness, irritability or fatigue:  Somewhat    Do you take opioid medications even sometimes? No     Living Will: Yes,   Copy on file    Who lives at home with you: wife  Do you have any services coming to your home (meals on wheels, home health, etc) ? : no      Do you need help with:  Using the phone:  No  Bathing: No  Dressing:  No  Toileting: No  Transportation:  No  Shopping: No  Preparing meals: No  Housework/Laundry: No  Medications: No  Money management: No    Does your home have:  Unsecured throw rugs: no  Grab bars in bathroom: No  Walk in shower: No  Seat in shower: No  Lit pathways for night (nightlights): No    Memory:  Have you or anyone close to you expressed concerns about your memory: No    Knows:  Month: No  Day: Yes  Year: Yes  Day of Week: Yes  Able to Recall (orange, boat, pencil) : Yes    Patient history:   Patient's medications, allergies, past medical, surgical, social and family histories were reviewed and updated in the EHR under History. Care Team:  Patient's list of care team members was updated in EHR under the Snap Shot. Immunizations: Reviewed with patient. Got covid vaccine in [place], will send in card    Health Maintenance Due   Topic Date Due    Hepatitis C screen  Never done    COVID-19 Vaccine (1) Never done   [employer] Visit (AWV)  Never done    Shingles Vaccine (3 of 3) 06/04/2019    Lipid screen  11/01/2020    Potassium monitoring  11/01/2020    Creatinine monitoring  11/01/2020       CHRONIC CONDITIONS     HA- went [facility] last year. Started on topamax and had EMG. Also advised to talk to therapist due to family stress. Also encouraged to wean off fioricet. Still gets some migraines but much better. Hasn't seen him since the first visit. Depression and anxiety got worse. In winter saw Dr. Surendra Cottrell - psych - he stopped the topamax, cut back on fioricet. Changed to lexapro. Also seeing a therapist Dr. Dhiraj Aburto. Feeling much better. Still takes fioricet maybe once a week for HA \"in my eyes. \" but not full migraines. Rarely every few months gets full migraine with aura. Has ubrelvy samples that can use if needs too. Has some low back radiculopathy issues. Saw Dr. Carolina Weeks, got injection and doing better since. Seeing Dr. Danelle Dailey yearly for prostate, psa is good. Physical Exam:    Body mass index is 25.23 kg/m². Vitals:    08/06/21 1006 08/06/21 1045   BP: (!) 144/76 (!) 146/82   Site: Left Upper Arm    Position: Sitting    Cuff Size: Medium Adult    Pulse: 62    Temp: 96.9 °F (36.1 °C)    TempSrc: Infrared    SpO2: 98%    Weight: 191 lb 3.2 oz (86.7 kg)    Height: 6' 1\" (1.854 m)      Wt Readings from Last 3 Encounters:   08/06/21 191 lb 3.2 oz (86.7 kg)   12/30/20 185 lb (83.9 kg)   12/03/20 185 lb (83.9 kg)       GENERAL:Alert and oriented x 4 NAD, affect appropriate and overweight, well hydrated, well developed.   LUNG:clear to auscultation bilaterally with normal respiratory effort  CV: Normal heart sounds, regular rate and rhythm without murmurs  EXTREMETY: no loss of hair, no edema, normal

## 2021-08-15 DIAGNOSIS — I10 ESSENTIAL HYPERTENSION, BENIGN: Chronic | ICD-10-CM

## 2021-08-16 RX ORDER — METOPROLOL SUCCINATE 50 MG/1
TABLET, EXTENDED RELEASE ORAL
Qty: 90 TABLET | Refills: 3 | Status: SHIPPED | OUTPATIENT
Start: 2021-08-16 | End: 2022-02-18 | Stop reason: SDUPTHER

## 2021-08-16 NOTE — TELEPHONE ENCOUNTER
Medication:   Requested Prescriptions     Pending Prescriptions Disp Refills    metoprolol succinate (TOPROL XL) 50 MG extended release tablet [Pharmacy Med Name: METOPROLOL SUCC ER 50 MG TAB] 90 tablet 1     Sig: TAKE ONE TABLET BY MOUTH DAILY          Patient Phone Number: [phone] (home)     Last appt: 8/6/2021   Next appt: Visit date not found    Last OARRS: No flowsheet data found.   PDMP Monitoring:    Last PDMP Anand Elo as Reviewed [facility]):  Review User Review Instant Review Result          Preferred Pharmacy:   [facility] [phone] - F [phone]  [address] [place], [place] [address]  Phone: [phone] Fax: [phone]    [address] [facility] - [place] [phone] - F [phone]  18 [facility] [address]  Phone: [phone] Fax: [phone][id number] - [address] [address] [place] [address], [facility] [address] [place] [address] [address]  [address] [place] [address]  Phone: [phone] Fax: [phone]    [address] Herb Kate  [phone] [facility] [phone]  [address] [place] [address] [address] [address]  Phone: [phone] Fax: [phone]

## 2021-10-25 ENCOUNTER — TELEPHONE (OUTPATIENT)
Dept: FAMILY MEDICINE CLINIC | Age: 76
End: 2021-10-25

## 2021-10-25 NOTE — TELEPHONE ENCOUNTER
Received vaccination administered notification from [address] [place] [address]. Scanned and abstracted to encounter.

## 2021-11-16 ENCOUNTER — PATIENT MESSAGE (OUTPATIENT)
Dept: CARDIOLOGY CLINIC | Age: 76
End: 2021-11-16

## 2021-11-16 DIAGNOSIS — E78.2 MIXED HYPERLIPIDEMIA: Primary | ICD-10-CM

## 2021-11-16 NOTE — TELEPHONE ENCOUNTER
From: Scarlett Cueva  To: Dr. Amish Parra: 11/16/2021 11:10 AM EST  Subject: Non-Urgent Medical Question    I have an appointent with Dr Jolynn Schuster for Jan 4 and I think I'm supposed to get some blood work done before then. If so, could you let me know what I need and how I should set it up. Prefer to get it done at [facility] if possible. Thanks.   Bob Mccallum

## 2021-12-13 RX ORDER — ATORVASTATIN CALCIUM 10 MG/1
TABLET, FILM COATED ORAL
Qty: 90 TABLET | Refills: 3 | Status: SHIPPED | OUTPATIENT
Start: 2021-12-13 | End: 2022-02-18 | Stop reason: SDUPTHER

## 2021-12-13 NOTE — TELEPHONE ENCOUNTER
Medication:   Requested Prescriptions     Pending Prescriptions Disp Refills    atorvastatin (LIPITOR) 10 MG tablet [Pharmacy Med Name: ATORVASTATIN 10 MG TABLET] 90 tablet 3     Sig: TAKE ONE TABLET BY MOUTH DAILY          Patient Phone Number: [phone] (home)     Last appt: 8/6/2021   Next appt: Visit date not found    Last OARRS: No flowsheet data found.   PDMP Monitoring:    Last PDMP Tera Gomez as Reviewed [facility]):  Review User Review Instant Review Result          Preferred Pharmacy:   [facility] [address], [place] Aide Hernandez [phone] - F [phone]  [address] [place] [address] [address]  Phone: [phone] Fax: [phone]    [facility] [facility] - [place] [phone] - F [phone]  18 [facility] [address]  Phone: [phone] Fax: [phone] #[id number] - [address] [address] [place] [address] [address] [place] [address] [address]  [address] [address]  Phone: [phone] Fax: [phone]    [facility] [address] [place] [address] [facility] [phone] Esteban Schmidt [phone]  [facility] [address] [address]  Phone: [phone] Fax: [phone]

## 2022-01-04 ENCOUNTER — OFFICE VISIT (OUTPATIENT)
Dept: CARDIOLOGY CLINIC | Age: 77
End: 2022-01-04
Payer: MEDICARE

## 2022-01-04 ENCOUNTER — PROCEDURE VISIT (OUTPATIENT)
Dept: CARDIOLOGY CLINIC | Age: 77
End: 2022-01-04
Payer: MEDICARE

## 2022-01-04 VITALS
OXYGEN SATURATION: 97 % | WEIGHT: 188 LBS | HEART RATE: 64 BPM | BODY MASS INDEX: 24.92 KG/M2 | SYSTOLIC BLOOD PRESSURE: 128 MMHG | DIASTOLIC BLOOD PRESSURE: 80 MMHG | HEIGHT: 73 IN

## 2022-01-04 DIAGNOSIS — I34.0 MITRAL VALVE INSUFFICIENCY, UNSPECIFIED ETIOLOGY: ICD-10-CM

## 2022-01-04 DIAGNOSIS — I10 ESSENTIAL HYPERTENSION, BENIGN: Primary | ICD-10-CM

## 2022-01-04 DIAGNOSIS — I10 ESSENTIAL HYPERTENSION, BENIGN: ICD-10-CM

## 2022-01-04 DIAGNOSIS — I35.1 NONRHEUMATIC AORTIC VALVE INSUFFICIENCY: Chronic | ICD-10-CM

## 2022-01-04 DIAGNOSIS — E78.2 MIXED HYPERLIPIDEMIA: Chronic | ICD-10-CM

## 2022-01-04 DIAGNOSIS — I35.1 AORTIC VALVE INSUFFICIENCY, ETIOLOGY OF CARDIAC VALVE DISEASE UNSPECIFIED: ICD-10-CM

## 2022-01-04 LAB
LV EF: 60 %
LVEF MODALITY: NORMAL

## 2022-01-04 PROCEDURE — 93306 TTE W/DOPPLER COMPLETE: CPT | Performed by: INTERNAL MEDICINE

## 2022-01-04 PROCEDURE — 93000 ELECTROCARDIOGRAM COMPLETE: CPT | Performed by: INTERNAL MEDICINE

## 2022-01-04 PROCEDURE — 99214 OFFICE O/P EST MOD 30 MIN: CPT | Performed by: INTERNAL MEDICINE

## 2022-01-04 RX ORDER — TRAZODONE HYDROCHLORIDE 50 MG/1
TABLET ORAL
Qty: 90 TABLET | Refills: 3 | Status: SHIPPED | OUTPATIENT
Start: 2022-01-04 | End: 2022-02-18 | Stop reason: SDUPTHER

## 2022-01-04 NOTE — PROGRESS NOTES
[facility]   Cardiac Evaluation      Patient: Jose Alberto Romero  YOB: 1945  Date: 1/4/22     CC: Valvular heart disease     Referring provider: Subhash Bacon MD    History of Present Illness:   Mr. Reyes Heard has a history of hypertension, aortic insufficiency/MR, hyperlipidemia. He has no history of coronary artery disease. He was an  at [employer]. Shaila Case in [employer]; he is retired. He does not smoke. Today, Mr Reyes Heard states he has positional vertigo  recently. He has headaches periodically, as well. He states from a cardiac standpoint he feels well. Brandi Ott denies chest pain, palpitations, MENEZES, or edema. He walks his dog daily and swims some. Many of  His previous neurologic symptoms subsided after cessation of his treatment with steroids for arteritis. Past Medical History:   has a past medical history of Anxiety state, unspecified, Arthritis, Hyperlipidemia, Hypertension, Hypertrophy of prostate without urinary obstruction and other lower urinary tract symptoms (LUTS), Irritable bowel syndrome, Migraine, unspecified, without mention of intractable migraine without mention of status migrainosus, Mitral valve disorders(424.0), Other and unspecified hyperlipidemia, Prostate cancer ([facility] [address] and Vitreous degeneration. Surgical History:   has a past surgical history that includes Tonsillectomy; Hip Arthroplasty (Right, 11/2018); Knee Arthroplasty (Left, 02/2019); Hydrocele surgery (Right); Cataract removal (Bilateral); Pain management procedure (Right, 12/30/2020); and IR US PLACE FOR RADIATION TX GUIDE PROSTATE.      Current Outpatient Medications   Medication Sig Dispense Refill    atorvastatin (LIPITOR) 10 MG tablet TAKE ONE TABLET BY MOUTH DAILY 90 tablet 3    metoprolol succinate (TOPROL XL) 50 MG extended release tablet TAKE ONE TABLET BY MOUTH DAILY 90 tablet 3    escitalopram (LEXAPRO) 20 MG tablet Take 20 mg by mouth daily Rx by Dr. Rachael Cortes butalbital-acetaminophen-caffeine (FIORICET, ESGIC) -40 MG per tablet Take 1 tablet by mouth every 4 hours as needed for Headaches TAKE O 60 tablet 1    traZODone (DESYREL) 50 MG tablet TAKE ONE TABLET BY MOUTH ONCE NIGHTLY 90 tablet 3    IBUPROFEN PO Take 200-400 mg by mouth 2 times daily as needed      LYSINE PO Take 1 tablet by mouth daily       triamcinolone (KENALOG) 0.1 % ointment Apply to affected areas twice daily for up to 2 weeks or until improved. For eczema. 450 g 1    cyclobenzaprine (FLEXERIL) 10 MG tablet TAKE 1 TABLET BY MOUTH THREE TIMES A DAY (Patient not taking: Reported on 8/6/2021)       No current facility-administered medications for this visit. 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    Food insecurity:     Worry: Not on file     Inability: Not on file   [employer] needs:     Medical: Not on file     Non-medical: Not on file   Tobacco Use    Smoking status: Never Smoker    Smokeless tobacco: Never Used   Substance and Sexual Activity    Alcohol use:  Yes     Alcohol/week: 0.0 standard drinks     Frequency: 4 or more times a week     Drinks per session: 1 or 2     Binge frequency: Never     Comment: occasional    Drug use: No    Sexual activity: Not on file   Lifestyle    Physical activity:     Days per week: Not on file     Minutes per session: Not on file    Stress: Not on file   Relationships    Social connections:     Talks on phone: Not on file     Gets together: Not on file     Attends Jehovah's witness service: Not on file     Active member of club or organization: Not on file     Attends meetings of clubs or organizations: Not on file     Relationship status: Not on file    Intimate partner violence:     Fear of current or ex partner: Not on file     Emotionally abused: Not on file     Physically abused: Not on file     Forced sexual activity: Not on file   Other Topics Concern    Not on file   Social History Narrative    Not on file     Family History:  family history includes Colon Cancer (age of onset: 76) in his brother; Diabetes type 2  in his brother; Drug Abuse in his son; Heart Attack in his father; Heart Disease in his mother; Hypertension in his mother; No Known Problems in his brother; Stroke in his mother. Allergies:  Oxycodone and Wound dressing adhesive     Review of Systems:   · Constitutional: there has been no unanticipated weight loss. No change in energy or activity level   · Eyes: No visual changes   · ENT: No Headaches, hearing loss or vertigo. No mouth sores or sore throat. · Cardiovascular: Reviewed in HPI  · Respiratory: No cough or wheezing, no sputum production. · Gastrointestinal: No abdominal pain, appetite loss, blood in stools. No change in bowel or bladder habits. · Genitourinary: No nocturia, dysuria, trouble voiding  · Musculoskeletal:  No gait disturbance, weakness or joint complaints. · Integumentary: No rash or pruritis. · Neurological: No headache, change in muscle strength, numbness or tingling. No change in gait, balance, coordination, mood, affect, memory, mentation, behavior. · Psychiatric: No anxiety or depression  · Endocrine: No malaise or fever  · Hematologic/Lymphatic: No abnormal bruising or bleeding, blood clots or swollen lymph nodes. · Allergic/Immunologic: No nasal congestion or hives. Physical Examination:    Vitals:    01/04/22 0953   BP: 128/80   Site: Left Upper Arm   Position: Sitting   Cuff Size: Medium Adult   Pulse: 64   SpO2: 97%   Weight: 188 lb (85.3 kg)   Height: 6' 1\" (1.854 m)     Body mass index is 24.8 kg/m².      Wt Readings from Last 3 Encounters:   01/04/22 188 lb (85.3 kg)   08/06/21 191 lb 3.2 oz (86.7 kg) 12/30/20 185 lb (83.9 kg)      BP Readings from Last 3 Encounters:   01/04/22 128/80   08/06/21 (!) 146/82   12/30/20 113/69      Constitutional and General Appearance:  appears stated age  Eyes - no xanthelasma  Respiratory:  · Normal excursion and expansion without use of accessory muscles  · Resp Auscultation: Normal breath sounds without dullness  Cardiovascular:  · The apical impulses not displaced  · Heart is regular rate and rhythm with normal S1, S2. 2/6 holosystolic murmur in the left lateral decubitus position, 1/6 BARRON more pronounced when bending over, no clearly audible diastolic murmur. · PMI is normal  · The carotid upstroke is normal, no bruit noted   · JVP is not elevated  · Peripheral pulses are symmetrical  · There is no clubbing, cyanosis of the extremities  · No edema  · No varicosities  · Femoral Arteries: 2+ and equal without bruits  · Pedal Pulses: 2+ and equal   Abdomen:  · No masses or tenderness  · Aorta not palpable  · Normal bowel sounds  Neurological/Psychiatric:  · Alert and oriented x3  · Moves all extremities well  · Exhibits normal gait balance and coordination    Assessment/Plan:  1. Essential hypertension, benign: well controlled    2. Aortic valve insufficiency / Mitral valve insufficiency: Stable  Echo 3/23/18> EF 55%, MVP, mild to moderate MR, mild to moderate AR. 3. Mixed hyperlipidemia: Labs 11/1/19> , , HDL 59, LDL 97. He takes Lipitor 10mg. PLAN:  Will repeat lipids and CMP. Will also repeat an echo. Scribe's attestation: This note was scribed in the presence of Dr Van Raman MD by Michael Donohue RN. The scribe's documentation has been prepared under my direction and personally reviewed by me in its entirety. I confirm that the note above accurately reflects all work, treatment, procedures, and medical decision making performed by me. Thank you for allowing to me to participate in the care of Sabiha Richards.

## 2022-02-18 ENCOUNTER — TELEPHONE (OUTPATIENT)
Dept: FAMILY MEDICINE CLINIC | Age: 77
End: 2022-02-18

## 2022-02-18 DIAGNOSIS — G47.00 INSOMNIA, UNSPECIFIED TYPE: ICD-10-CM

## 2022-02-18 DIAGNOSIS — I10 ESSENTIAL HYPERTENSION, BENIGN: Chronic | ICD-10-CM

## 2022-02-18 RX ORDER — ZOLPIDEM TARTRATE 5 MG/1
5 TABLET ORAL NIGHTLY PRN
Qty: 30 TABLET | Refills: 0 | Status: SHIPPED | OUTPATIENT
Start: 2022-02-18 | End: 2022-03-20

## 2022-02-18 RX ORDER — ESCITALOPRAM OXALATE 20 MG/1
20 TABLET ORAL DAILY
Qty: 30 TABLET | Refills: 0 | Status: SHIPPED | OUTPATIENT
Start: 2022-02-18

## 2022-02-18 RX ORDER — BUTALBITAL, ACETAMINOPHEN AND CAFFEINE 50; 325; 40 MG/1; MG/1; MG/1
1 TABLET ORAL EVERY 4 HOURS PRN
Qty: 60 TABLET | Refills: 0 | Status: SHIPPED | OUTPATIENT
Start: 2022-02-18 | End: 2022-07-22

## 2022-02-18 RX ORDER — TRAZODONE HYDROCHLORIDE 50 MG/1
TABLET ORAL
Qty: 30 TABLET | Refills: 0 | Status: SHIPPED | OUTPATIENT
Start: 2022-02-18 | End: 2022-09-06

## 2022-02-18 RX ORDER — ATORVASTATIN CALCIUM 10 MG/1
TABLET, FILM COATED ORAL
Qty: 30 TABLET | Refills: 0 | Status: SHIPPED | OUTPATIENT
Start: 2022-02-18

## 2022-02-18 RX ORDER — METOPROLOL SUCCINATE 50 MG/1
TABLET, EXTENDED RELEASE ORAL
Qty: 30 TABLET | Refills: 0 | Status: SHIPPED | OUTPATIENT
Start: 2022-02-18 | End: 2022-06-06

## 2022-02-18 NOTE — TELEPHONE ENCOUNTER
traZODone (DESYREL) 50 MG tablet 90 tablet 3 1/4/2022     Sig: TAKE ONE TABLET BY MOUTH ONCE NIGHT      atorvastatin (LIPITOR) 10 MG tablet 90 tablet 3 12/13/2021     Sig: TAKE ONE TABLET BY MOUTH DAILY      zolpidem (AMBIEN) 5 MG tablet 30 tablet 5 11/2/2021 12/2/2021    Sig - Route: Take 1 tablet by mouth nightly as needed for Sleep for up to 30 days. - Oral      metoprolol succinate (TOPROL XL) 50 MG extended release tablet 90 tablet 3 8/16/2021     Sig: TAKE ONE TABLET BY MOUTH DAILY      butalbital-acetaminophen-caffeine (FIORICET, ESGIC) -40 MG per tablet 60 tablet 1 8/6/2021     Sig - Route: Take 1 tablet by mouth every 4 hours as needed for Headaches TAKE O - Oral      escitalopram (LEXAPRO) 20 MG tablet        Sig - Route:  Take 20 mg by mouth daily Rx by Dr. Eliecer Rodriguez - Oral            Use the pharmacy that is listed in his previous note

## 2022-02-18 NOTE — TELEPHONE ENCOUNTER
Medication:   Requested Prescriptions     Pending Prescriptions Disp Refills    zolpidem (AMBIEN) 5 MG tablet 30 tablet 0     Sig: Take 1 tablet by mouth nightly as needed for Sleep for up to 30 days.  traZODone (DESYREL) 50 MG tablet 90 tablet 3     Sig: TAKE ONE TABLET BY MOUTH ONCE NIGHTLY    atorvastatin (LIPITOR) 10 MG tablet 90 tablet 3     Sig: TAKE ONE TABLET BY MOUTH DAILY    metoprolol succinate (TOPROL XL) 50 MG extended release tablet 90 tablet 3     Sig: TAKE ONE TABLET BY MOUTH DAILY    escitalopram (LEXAPRO) 20 MG tablet 30 tablet 5     Sig: Take 1 tablet by mouth daily Rx by Dr. Sammi Osuna butalbital-acetaminophen-caffeine (FIORICET, ESGIC) -40 MG per tablet 60 tablet 1     Sig: Take 1 tablet by mouth every 4 hours as needed for Headaches TAKE O      Last Filled:  1/4/22    Patient Phone Number: [phone] (home)     Last appt: 8/6/2021   Next appt: Visit date not found    Last OARRS: No flowsheet data found.   PDMP Monitoring:    Last PDMP Shruthi Moulton as Reviewed [facility]):  Review User Review Instant Review Result          Preferred Pharmacy:   [address] [phone]  [address] [place] [address]  Phone: [phone] Fax: [phone]    [address] [facility] [phone] - f [phone]  [address] [place] [address]  Phone: [phone] Fax: [phone] #[id number] - [address] [address] [place], [place] [address] [phone] [facility] [phone]  [address] [place] [address]  Phone: [phone] Fax: [phone]    [facility] Herb Elam  [phone] - F [phone]  [address] [address]  Phone: [phone] Fax: [phone], [address] [place]  [address] [place] [address] [phone]  [address] [address]  Phone: [phone] Fax: [phone]

## 2022-02-18 NOTE — TELEPHONE ENCOUNTER
PT is requesting to speak to Dr Reinaldo Abreu about him going to [place] and he left all his medications behind. . PT wanted to see if Dr Reinaldo Abreu can call something into the [address], [facility], [id number] [place] [address] [phone]. .. Please advise? ?

## 2022-02-22 ENCOUNTER — TELEPHONE (OUTPATIENT)
Dept: ADMINISTRATIVE | Age: 77
End: 2022-02-22

## 2022-02-22 NOTE — TELEPHONE ENCOUNTER
Submitted PA for Zolpidem Tartrate 5MG tablets Via CMM Key: [id number] STATUS: APPROVED effective 1/1/2022-12/31/2022. Please notify patient.  Thank you

## 2022-03-28 ENCOUNTER — OFFICE VISIT (OUTPATIENT)
Dept: DERMATOLOGY | Age: 77
End: 2022-03-28
Payer: MEDICARE

## 2022-03-28 VITALS — TEMPERATURE: 97.3 F

## 2022-03-28 DIAGNOSIS — L57.0 AK (ACTINIC KERATOSIS): ICD-10-CM

## 2022-03-28 DIAGNOSIS — L20.84 INTRINSIC ATOPIC DERMATITIS: Primary | ICD-10-CM

## 2022-03-28 DIAGNOSIS — Z85.828 HISTORY OF BASAL CELL CARCINOMA (BCC): ICD-10-CM

## 2022-03-28 DIAGNOSIS — K13.0 ANGULAR CHEILITIS: ICD-10-CM

## 2022-03-28 PROCEDURE — 17000 DESTRUCT PREMALG LESION: CPT | Performed by: DERMATOLOGY

## 2022-03-28 PROCEDURE — 99213 OFFICE O/P EST LOW 20 MIN: CPT | Performed by: DERMATOLOGY

## 2022-03-28 PROCEDURE — 17003 DESTRUCT PREMALG LES 2-14: CPT | Performed by: DERMATOLOGY

## 2022-03-28 RX ORDER — NYSTATIN AND TRIAMCINOLONE ACETONIDE 100000; 1 [USP'U]/G; MG/G
OINTMENT TOPICAL
Qty: 15 G | Refills: 2 | Status: SHIPPED | OUTPATIENT
Start: 2022-03-28

## 2022-03-28 NOTE — PROGRESS NOTES
[facility] Dermatology  Avelino Liu MD  [address] [place] [address]  1945    77 y.o. male     Date of Visit: 3/28/2022    Chief Complaint: Atopic dermatitis, skin lesions    History of Present Illness:    1. He returns today for history of atopic dermatitis. He continues to affect his trunk and extremities. Triamcinolone 0.1% ointment helps. However, he complains of increased itching and burning after showers that does not respond to topical steroid therapy. It typically resolves within an hour or 2. Has gone gluten free in diet and has helped. 2.  He also complains of few scaly lesions on the face. 3.  He also complains of an eruption at the corners of the mouth that comes and goes. 4.  He has a history of a basal cell carcinoma on the left superior helix that was treated with Mohs surgery in 2009. Review of Systems:  Gen: Feels well, good sense of health. Past Medical History, Family History, Surgical History, Medications and Allergies reviewed.     Past Medical History:   Diagnosis Date    Anxiety state, unspecified     Arthritis 03/2017    Hyperlipidemia     Hypertension     Hypertrophy of prostate without urinary obstruction and other lower urinary tract symptoms (LUTS)     Irritable bowel syndrome     Migraine, unspecified, without mention of intractable migraine without mention of status migrainosus     Mitral valve disorders(424.0)     Other and unspecified hyperlipidemia     Prostate cancer (HCC)     tx with cyber knife radiation    Vitreous degeneration      Past Surgical History:   Procedure Laterality Date    CATARACT REMOVAL Bilateral     HIP ARTHROPLASTY Right 11/2018    Dr. Rhett Maciel Right     x3 due to infection    IR US PLACE FOR RADIATION TX GUIDE PROSTATE      Cyberknife    KNEE ARTHROPLASTY Left 02/2019    Dr. Marquita Ramos Right 12/30/2020    RIGHT L5 AND S1 TRANSFORAMINAL EPIDURAL STEROID INJECTION WITH FLUOROSCOPYR performed by Davon Carmen MD at [facility]   Allergen Reactions    Oxycodone      \"severe hiccups\" per patient on at least 2 occasions; does tolerate tramadol    Wound Dressing Adhesive Rash     Outpatient Medications Marked as Taking for the 3/28/22 encounter (Office Visit) with Yamilka Chavez MD   Medication Sig Dispense Refill    nystatin-triamcinolone [facility]) 612670-0.1 UNIT/GM-% ointment Apply topically 2 times daily to the corners of the mouth until improved. 15 g 2    triamcinolone (KENALOG) 0.1 % ointment Apply to affected areas twice daily for up to 2 weeks or until improved. For eczema. 450 g 3    traZODone (DESYREL) 50 MG tablet TAKE ONE TABLET BY MOUTH ONCE NIGHTLY 30 tablet 0    atorvastatin (LIPITOR) 10 MG tablet TAKE ONE TABLET BY MOUTH DAILY 30 tablet 0    metoprolol succinate (TOPROL XL) 50 MG extended release tablet TAKE ONE TABLET BY MOUTH DAILY 30 tablet 0    escitalopram (LEXAPRO) 20 MG tablet Take 1 tablet by mouth daily Rx by Dr. Aspen Orourke 30 tablet 0    butalbital-acetaminophen-caffeine (FIORICET, ESGIC) -40 MG per tablet Take 1 tablet by mouth every 4 hours as needed for Headaches TAKE O 60 tablet 0    cyclobenzaprine (FLEXERIL) 10 MG tablet TAKE 1 TABLET BY MOUTH THREE TIMES A DAY      IBUPROFEN PO Take 200-400 mg by mouth 2 times daily as needed      LYSINE PO Take 1 tablet by mouth daily            Physical Examination       The following were examined and determined to be normal: Psych/Neuro, Conjunctivae/eyelids, Neck and Nails/digits. The following were examined and determined to be abnormal: Scalp/hair, Head/face, Gums/teeth/lips, Breast/axilla/chest, Abdomen, Back, RUE, LUE, RLE and LLE. Well-appearing. 1.  Scattered on the trunk and extremities are ill-defined scaly pink patches.     2.  Vertex scalp-3, right temple-1, left temple-1, right mid helix-1: Several keratotic erythematous macules. 3.  Angles of the mouth with fissured erythematous papules. 4.  Clear. Assessment and Plan     1. Intrinsic atopic dermatitis -moderately extensive but mild    Continue triamcinolone 0.1% ointment twice daily as needed for recurrences. Encouraged use of a cream based moisturizer 1-2 times daily. 2. AK (actinic keratosis) -several    2 cycles of liquid nitrogen applied to 6 AKs: vertex scalp-3, right temple-1, left temple-1, right mid helix-1. Patient was educated regarding the potential risks of blister formation and discomfort. Wound care was discussed. 3. Angular cheilitis     Mycolog ointment twice daily until improved and then as needed for recurrences. 4. History of basal cell carcinoma (BCC) -clear. Sun protective behaviors, including use of at least SPF 30 sunscreen, and self skin examinations were encouraged. Call for any new or concerning lesions. Return in about 1 year (around 3/28/2023).     --Carli King MD

## 2022-04-01 NOTE — PROGRESS NOTES
Hematology Oncology Daily Progress Note    Admit Date: 3/22/2022  Hospital day several    Subjective:     Patient has complaints of improved abdominal pain--denies sob/cp. Medication side effects: none    Scheduled Meds:   nystatin  5 mL Oral 4x Daily    pantoprazole  40 mg Oral QAM AC    amLODIPine  10 mg Oral Daily    losartan  50 mg Oral Nightly    warfarin placeholder: dosing by pharmacy   Other RX Placeholder    lactated ringers bolus  500 mL IntraVENous Once    vancomycin  250 mg Oral 4x Daily    sodium chloride flush  5-40 mL IntraVENous 2 times per day    insulin lispro  0-6 Units SubCUTAneous TID WC    insulin lispro  0-3 Units SubCUTAneous Nightly     Continuous Infusions:   sodium chloride      sodium chloride 25 mL (03/24/22 1114)    dextrose       PRN Meds:heparin (porcine), sodium chloride, phenol, morphine **OR** morphine, albumin human, midodrine, heparin (porcine), diphenhydrAMINE, sodium chloride flush, sodium chloride, ondansetron **OR** ondansetron, polyethylene glycol, acetaminophen **OR** acetaminophen, oxyCODONE, glucose, dextrose, glucagon (rDNA), dextrose    Review of Systems  Pertinent items are noted in HPI. REVIEW OF SYSTEMS:         · Constitutional: Denies fever, sweats, weight loss     · Eyes: No visual changes or diplopia. No scleral icterus. · ENT: No Headaches, hearing loss or vertigo. No mouth sores or sore throat. · Cardiovascular: No chest pain, dyspnea on exertion, palpitations or loss of consciousness. · Respiratory: No cough or wheezing, no sputum production. No hemoptysis. .    · Gastrointestinal: No abdominal pain, appetite loss, blood in stools. No change in bowel habits. · Genitourinary: No dysuria, trouble voiding, or hematuria. · Musculoskeletal:  Generalized weakness. No joint complaints. · Integumentary: No rash or pruritis. · Neurological: No headache, diplopia. No change in gait, balance, or coordination. No paresthesias.   · Endocrine: No Yes Historical Provider, MD   LYSINE PO Take 1 tablet by mouth daily  Yes Historical Provider, MD        Allergies   Allergen Reactions    Oxycodone      \"severe hiccups\" per patient on at least 2 occasions; does tolerate tramadol       Past Medical History:   Diagnosis Date    Anxiety state, unspecified     Arthritis 03/2017    Hyperlipidemia     Hypertension     Hypertrophy of prostate without urinary obstruction and other lower urinary tract symptoms (LUTS)     Irritable bowel syndrome     Migraine, unspecified, without mention of intractable migraine without mention of status migrainosus     Mitral valve disorders(424.0)     Other and unspecified hyperlipidemia     Prostate cancer (HCC)     tx with cyber knife radiation    Vitreous degeneration        Past Surgical History:   Procedure Laterality Date    HIP ARTHROPLASTY Right 11/2018    [facility], Dr. Jack Valencia Right     x3 due to infection    JOINT REPLACEMENT  02/2019    KNEE ARTHROPLASTY Left 02/2019    Dr. Blaire Bruno History     Socioeconomic History    Marital status:      Spouse name: Not on file    Number of children: Not on file    Years of education: Not on file    Highest education level: Not on file   Occupational History    Not on file   Social Needs    Financial resource strain: Not on file    Food insecurity:     Worry: Not on file     Inability: Not on file    Transportation needs:     Medical: Not on file     Non-medical: Not on file   Tobacco Use    Smoking status: Never Smoker    Smokeless tobacco: Never Used   Substance and Sexual Activity    Alcohol use:  Yes     Alcohol/week: 0.0 standard drinks     Frequency: 4 or more times a week     Drinks per session: 1 or 2     Binge frequency: Never     Comment: occasional    Drug use: No    Sexual activity: Not on file   Lifestyle    Physical activity:     Days per week: Not on file     Minutes per session: Not on file temperature intolerance. No excessive thirst, fluid intake, or urination. · Hematologic/Lymphatic: No abnormal bruising or ecchymoses, blood clots or swollen lymph nodes. · Allergic/Immunologic: No nasal congestion or hives. ·     Objective:     Patient Vitals for the past 8 hrs:   BP Temp Pulse Resp SpO2 Weight   04/01/22 0751 (!) 165/90 97.6 °F (36.4 °C) 83 16 100 % 175 lb 14.8 oz (79.8 kg)   04/01/22 0645 -- -- -- -- -- 183 lb 3.2 oz (83.1 kg)     I/O last 3 completed shifts:  In: -   Out: 125 [Stool:125]  No intake/output data recorded.     BP (!) 165/90   Pulse 83   Temp 97.6 °F (36.4 °C)   Resp 16   Ht 5' 3\" (1.6 m)   Wt 175 lb 14.8 oz (79.8 kg)   SpO2 100%   BMI 31.16 kg/m²     General Appearance:    Alert, cooperative, no distress, appears stated age   Head:    Normocephalic, without obvious abnormality, atraumatic   Eyes:    PERRL, conjunctiva/corneas clear, EOM's intact, fundi     benign, both eyes        Ears:    Normal TM's and external ear canals, both ears   Nose:   Nares normal, septum midline, mucosa normal, no drainage    or sinus tenderness   Throat:   Lips, mucosa, and tongue normal; teeth and gums normal   Neck:   Supple, symmetrical, trachea midline, no adenopathy;        thyroid:  No enlargement/tenderness/nodules; no carotid    bruit or JVD   Back:     Symmetric, no curvature, ROM normal, no CVA tenderness   Lungs:     Clear to auscultation bilaterally, respirations unlabored   Chest wall:    No tenderness or deformity   Heart:    Regular rate and rhythm, S1 and S2 normal, no murmur, rub   or gallop   Abdomen:     Soft, non-tender, bowel sounds active all four quadrants,     no masses, no organomegaly           Extremities:   Extremities normal, atraumatic, no cyanosis or edema   Pulses:   2+ and symmetric all extremities   Skin:   Skin color, texture, turgor normal, no rashes or lesions   Lymph nodes:   Cervical, supraclavicular, and axillary nodes normal   Neurologic:   CNII-XII intact. Normal strength, sensation and reflexes       throughout       Data Review  CBC:   Lab Results   Component Value Date    WBC 8.8 04/01/2022    RBC 3.00 04/01/2022       Assessment:     Principal Problem:    Colitis  Active Problems:    Cancer of sigmoid colon ([facility] [address]    Hepatic lesion  Resolved Problems:    * No resolved hospital problems. *      Plan:     1. Stage IV colon cancer. She should follow-up with Dr. Otf Ortiz. He will decide as an outpatient which chemotherapy regimen to start. 2.  Pulmonary embolus. She is on Coumadin. 3.  Anemia. Her hemoglobin is improving. We will check in again on Monday if pt still in hospital.  Please call us if questions arise.       Electronically signed by Antonella Moreno MD on 4/1/2022 at 10:22 AM

## 2022-04-15 ENCOUNTER — TELEPHONE (OUTPATIENT)
Dept: FAMILY MEDICINE CLINIC | Age: 77
End: 2022-04-15

## 2022-04-18 ENCOUNTER — PATIENT MESSAGE (OUTPATIENT)
Dept: FAMILY MEDICINE CLINIC | Age: 77
End: 2022-04-18

## 2022-04-18 DIAGNOSIS — Z01.10 ENCOUNTER FOR HEARING EXAMINATION, UNSPECIFIED WHETHER ABNORMAL FINDINGS: ICD-10-CM

## 2022-04-18 DIAGNOSIS — H91.90 DECREASED HEARING, UNSPECIFIED LATERALITY: ICD-10-CM

## 2022-04-18 NOTE — TELEPHONE ENCOUNTER
From: Tamiko Baez  To: Dr. Jamil Poet: 4/18/2022 2:42 PM EDT  Subject: Referral for hearing check    I have an appointment at the [facility] AT [address] [place] [address] and Hearing Clinic this Thursday, and need a referral from Dr Alisa Alvarenga for that appointment. The fax number is [phone]. Phone is [phone]. Address is [address], [address], [place] [address]. Thanks.   Medina Bell

## 2022-04-26 ENCOUNTER — TELEPHONE (OUTPATIENT)
Dept: FAMILY MEDICINE CLINIC | Age: 77
End: 2022-04-26

## 2022-05-03 DIAGNOSIS — G47.00 INSOMNIA, UNSPECIFIED TYPE: Primary | ICD-10-CM

## 2022-05-03 RX ORDER — ZOLPIDEM TARTRATE 5 MG/1
TABLET ORAL
Qty: 30 TABLET | Refills: 3 | Status: SHIPPED | OUTPATIENT
Start: 2022-05-03 | End: 2022-06-02

## 2022-05-03 NOTE — TELEPHONE ENCOUNTER
Medication:   Requested Prescriptions     Pending Prescriptions Disp Refills    zolpidem (AMBIEN) 5 MG tablet [Pharmacy Med Name: ZOLPIDEM TARTRATE 5 MG TABLET] 30 tablet      Sig: TAKE ONE TABLET BY MOUTH EVERY NIGHT AT BEDTIME AS NEEDED FOR SLEEP      Last Filled:  2/18/22    Patient Phone Number: [phone] (home)     Last appt: 8/6/2021   Next appt: Visit date not found    Last OARRS: No flowsheet data found.   PDMP Monitoring:    Last PDMP Gail Mendoza as Reviewed [facility]):  Review User Review Instant Review Result          Preferred Pharmacy:   [facility] [id number] - [address] [phone]  [facility] 99 [address]  Phone: [phone] Fax: [phone]    [address] [facility] Shanika [phone] - F [phone]  [address] [place] [address]  Phone: [phone] Fax: [phone], [place] [address] [phone] Gualberto Harrison [phone]  [address] [place] [address]  Phone: [phone] Fax: [phone]     [facility] [address] [phone] Gualberto Harrison [phone]  [address] [place]  [facility] 96 [address]  Phone: [phone] Fax: [phone], [address] [facility] [place],[facility] 206 [phone] Gualberto Harrison [phone]  [address] [address]  Phone: [phone] Fax: [phone]

## 2022-06-03 ENCOUNTER — TELEPHONE (OUTPATIENT)
Dept: FAMILY MEDICINE CLINIC | Age: 77
End: 2022-06-03

## 2022-06-03 NOTE — TELEPHONE ENCOUNTER
----- Message from Victoria Alex sent at 6/3/2022 11:13 AM EDT -----  Subject: Appointment Request    Reason for Call: Urgent Abdominal Pain    QUESTIONS  Type of Appointment? Established Patient  Reason for appointment request? No appointments available during search  Additional Information for Provider? [facility] stating he is   having stomach issues that are concerning. Patient states that when he   eats he then has gas and belching and some stomach cramping and stomach is   noisy frequently. Patient tested positive for COVID 19 on 5/31/2022 and   this stomach issue happened before he tested positive he is on anti viral   medication. Patient states stomach issue ongoing for about 1 month.  ---------------------------------------------------------------------------  --------------  CALL BACK INFO  What is the best way for the office to contact you? OK to leave message on   voicemail  Preferred Call Back Phone Number? [phone]  ---------------------------------------------------------------------------  --------------  SCRIPT ANSWERS  Relationship to Patient? Self  Do you have pain that has started or worsened within the past 24 hours? No  Are you vomiting blood or have bloody or black stool? No  Have you recently (14 days) seen a provider for this pain? No  Have you been diagnosed with, awaiting test results for, or told that you   are suspected of having COVID-19 (Coronavirus)? (If patient has tested   negative or was tested as a requirement for work, school, or travel and   not based on symptoms, answer no)? Yes  Did your symptoms begin within the past 10 days or was your positive test   result within the past 10 days?  Yes
Ok for our last same day for Mon?
Patient informed. Appointment set.
Yes, since day 6 will need to be on Red side
Sepsis, due to unspecified organism

## 2022-06-06 ENCOUNTER — OFFICE VISIT (OUTPATIENT)
Dept: FAMILY MEDICINE CLINIC | Age: 77
End: 2022-06-06
Payer: MEDICARE

## 2022-06-06 DIAGNOSIS — R13.10 DYSPHAGIA, UNSPECIFIED TYPE: Primary | ICD-10-CM

## 2022-06-06 DIAGNOSIS — R14.0 BLOATING: ICD-10-CM

## 2022-06-06 DIAGNOSIS — I10 ESSENTIAL HYPERTENSION, BENIGN: Chronic | ICD-10-CM

## 2022-06-06 DIAGNOSIS — T50.905A ADVERSE DRUG REACTION, INITIAL ENCOUNTER: ICD-10-CM

## 2022-06-06 DIAGNOSIS — R14.2 BELCHING: ICD-10-CM

## 2022-06-06 DIAGNOSIS — R19.8 CHANGE IN BOWEL FUNCTION: ICD-10-CM

## 2022-06-06 PROCEDURE — 99214 OFFICE O/P EST MOD 30 MIN: CPT | Performed by: FAMILY MEDICINE

## 2022-06-06 PROCEDURE — 1123F ACP DISCUSS/DSCN MKR DOCD: CPT | Performed by: FAMILY MEDICINE

## 2022-06-06 RX ORDER — PANTOPRAZOLE SODIUM 40 MG/1
40 TABLET, DELAYED RELEASE ORAL
Qty: 30 TABLET | Refills: 1 | Status: SHIPPED | OUTPATIENT
Start: 2022-06-06 | End: 2022-08-04

## 2022-06-06 RX ORDER — METOPROLOL SUCCINATE 50 MG/1
TABLET, EXTENDED RELEASE ORAL
Qty: 90 TABLET | Refills: 0 | Status: SHIPPED | OUTPATIENT
Start: 2022-06-06 | End: 2022-08-05

## 2022-06-06 NOTE — PROGRESS NOTES
Has been having lot of gas and belching and rumbling x few months. No pain. With BM more frequent and more urgent. Stool no blood. Soft, sometimes in AM will have diarrhea at end. No constipation. Had hx radiation several years ago. Also sometimes when eating the belching feeling will start while eating like more full and sometimes hard getting down. Not every meal. Doesn't feel like anything gets stuck but more slow. Khalida with chicken khalida after several bites. Also with breads. Occasionally gets reflux but not bad and doesn't need meds for it. States took some kind of medication in past with IBS D. Brother with terminal colon cancer so concerned about that. Also pain in right palm. Feels like can't open hand all the way - sore. Also has arms and leg diffuse red itchy rash - just finished molnupiravir, dx with covid 5/31. Feeling better overall. There were no vitals filed for this visit. Wt Readings from Last 3 Encounters:   01/04/22 188 lb (85.3 kg)   08/06/21 191 lb 3.2 oz (86.7 kg)   12/30/20 185 lb (83.9 kg)     There is no height or weight on file to calculate BMI. PHQ Scores 7/30/2021 2/21/2019 7/17/2018 7/17/2018   PHQ2 Score 1 0 1 0   PHQ9 Score 1 0 1 0       Interpretation of Total Score Depression Severity: 1-4 = Minimal depression, 5-9 = Mild depression, 10-14 = Moderate depression, 15-19 = Moderately severe depression, 20-27 = Severe depression       GEN: Alert and oriented x 4 NAD, affect appropriate and normal appearing weight, well hydrated, well developed. ABD: normal BS, soft, non-tender, non-distended, no masses, no rebound, no guarding, no organomegaly  Diffusely erythematous rash arms, legs, trunk c/w drug reaction      ASSESSMENT AND PLAN:       Diagnoses and all orders for this visit:    Dysphagia, unspecified type  -     TSH with Reflex; Future  -     Comprehensive Metabolic Panel; Future  -     CBC;  Future  -     External Referral To Gastroenterology  Check labs  Refer to GI for EGD and colonoscopy  (Labs ordered contributed to MDM)    Change in bowel function  -     TSH with Reflex; Future  -     Comprehensive Metabolic Panel; Future  -     CBC; Future  -     External Referral To Gastroenterology    Belching  -     TSH with Reflex; Future  -     Comprehensive Metabolic Panel; Future  -     CBC; Future  -     External Referral To Gastroenterology  -     pantoprazole (PROTONIX) 40 MG tablet; Take 1 tablet by mouth every morning (before breakfast)  Trial PPI  F/u with GI    Bloating  -     TSH with Reflex; Future  -     Comprehensive Metabolic Panel; Future  -     CBC;  Future  -     External Referral To Gastroenterology    Adverse drug reaction, initial encounter  Due to molnupiravir  Done with meds now  Sx tx with antihistamines if needed          Portions of Note per  Bobetta Sever, CMA AAMA with corrections and edits per Jair Hutchison MD.  I agree with entirety of note and was present and performed history and physical.  I also confirm that the note above accurately reflects all work, treatment, procedures, and medical decision making performed by me, Jair Hutchison MD

## 2022-06-06 NOTE — TELEPHONE ENCOUNTER
Medication:   Requested Prescriptions     Pending Prescriptions Disp Refills    metoprolol succinate (TOPROL XL) 50 MG extended release tablet [Pharmacy Med Name: METOPROLOL SUCC ER 50 MG TAB] 90 tablet      Sig: TAKE ONE TABLET BY MOUTH DAILY      Last Filled:      Patient Phone Number: [phone] (home)     Last appt: 8/6/2021   Next appt:     Last OARRS: No flowsheet data found.   PDMP Monitoring:    Last PDMP Omari Arango as Reviewed [facility]):  Review User Review Instant Review Result          Preferred Pharmacy:   Andrzej Berry [id number] - [address] [phone]  [facility] 86 [address]  Phone: [phone] Fax: [phone]    [facility] [phone] - F [phone]  [address] [place] [address]  Phone: [phone] Fax: [phone] [place] [address] [phone] Arian Lindsay [phone]  [address] [place] [address]  Phone: [phone] Fax: [phone]    [facility] [address], [address] [phone] Arian Lindsay [phone]  [address]  [facility] 96 [address]  Phone: [phone] Fax: [phone], [address],[facility] 206 [phone] Arian Lindsay [phone]  [address] [address]  Phone: [phone] Fax: [phone]

## 2022-07-01 ENCOUNTER — TELEPHONE (OUTPATIENT)
Dept: CARDIOLOGY CLINIC | Age: 77
End: 2022-07-01

## 2022-07-01 DIAGNOSIS — E78.2 MIXED HYPERLIPIDEMIA: Primary | ICD-10-CM

## 2022-07-01 LAB
ALBUMIN SERPL-MCNC: 4.7 G/DL (ref 3.5–5)
ALP BLD-CCNC: 92 IU/L (ref 40–129)
ALT SERPL-CCNC: 21 IU/L (ref 10–50)
ANION GAP SERPL CALCULATED.3IONS-SCNC: 7 MMOL/L (ref 6–18)
AST SERPL-CCNC: 25 IU/L (ref 10–50)
BILIRUB SERPL-MCNC: 0.8 MG/DL (ref 0–1)
BUN BLDV-MCNC: 24 MG/DL (ref 8–26)
CALCIUM SERPL-MCNC: 9.6 MG/DL (ref 8.8–10.1)
CHLORIDE BLD-SCNC: 106 MEQ/L (ref 98–111)
CHOLESTEROL, TOTAL: 182 MG/DL
CO2: 26 MMOL/L (ref 21–31)
CREAT SERPL-MCNC: 0.9 MG/DL (ref 0.64–1.27)
EGFR (CKD-EPI): 89 ML/MIN/1.73 M2
GLUCOSE BLD-MCNC: 93 MG/DL (ref 70–99)
HCT VFR BLD CALC: 46.5 % (ref 40–50)
HDLC SERPL-MCNC: 55 MG/DL
HEMOGLOBIN: 15.9 G/DL (ref 13.5–16.5)
LDL CHOLESTEROL CALCULATED: 98 MG/DL
MCH RBC QN AUTO: 30 PG (ref 27–33)
MCHC RBC AUTO-ENTMCNC: 34.3 G/DL (ref 32–36)
MCV RBC AUTO: 87.4 FL (ref 82–97)
NONHDLC SERPL-MCNC: 127 MG/DL
PDW BLD-RTO: 13.4 %
PLATELET # BLD: 188 THOU/MCL (ref 140–375)
PMV BLD AUTO: 8.3 FL (ref 7.4–11.5)
POTASSIUM SERPL-SCNC: 4.3 MEQ/L (ref 3.6–5.1)
RBC # BLD: 5.32 MIL/MCL (ref 4.4–5.8)
SODIUM BLD-SCNC: 139 MEQ/L (ref 135–145)
TOTAL PROTEIN: 6 G/DL (ref 6.6–8.7)
TRIGL SERPL-MCNC: 147 MG/DL
TSH ULTRASENSITIVE: 1.4 (ref 0.27–4.2)
WBC # BLD: 5.3 THOU/MCL (ref 3.6–10.5)

## 2022-07-01 NOTE — TELEPHONE ENCOUNTER
----- Message from Michael Valenzuela MD sent at 7/1/2022  5:04 PM EDT -----  Call pt chol is normal on present dose of lipitor 10 mg.

## 2022-07-22 RX ORDER — BUTALBITAL, ACETAMINOPHEN AND CAFFEINE 50; 325; 40 MG/1; MG/1; MG/1
1 TABLET ORAL EVERY 4 HOURS PRN
Qty: 180 TABLET | Refills: 0 | Status: SHIPPED | OUTPATIENT
Start: 2022-07-22

## 2022-07-22 NOTE — TELEPHONE ENCOUNTER
Medication:   Requested Prescriptions     Pending Prescriptions Disp Refills    butalbital-acetaminophen-caffeine (FIORICET, ESGIC) -40 MG per tablet [Pharmacy Med Name: BUTALBITAL-ACETAMN-CAF -40 TB] 180 tablet      Sig: TAKE ONE TABLET BY MOUTH EVERY 4 HOURS AS NEEDED FOR HEADACHE          Patient Phone Number: [phone] (home)     Last appt: 6/6/2022   Next appt: Visit date not found    Last OARRS: No flowsheet data found.   PDMP Monitoring:    Last PDMP Tera Gomez as Reviewed [facility]):  Review User Review Instant Review Result          Preferred Pharmacy:   [facility] [id number] - [address] [phone]  [facility] 86 [address]  Phone: [phone] Fax: [phone]    E[address] [facility] (Now [address] [employer] Mail Delivery) [facility] [phone] -  [phone]  [address] [place] [address]  Phone: [phone] Fax: [phone] [place], [place] [address]e,[address] [place] [address] [phone] Esteban Schmidt [phone]  [address] [place] [address]  Phone: [phone] Fax: [phone]     [facility] [address] [phone] Esteban Schmidt [phone]  [address] [place] [address]  Phone: [phone] Fax: [phone], [facility], [facility] 91 - [facility] [address],[facility] 206 [phone] Esteban Schmidt [phone]  [place] [address] [place] [address]  Phone: [phone] Fax: [phone]

## 2022-08-03 NOTE — TELEPHONE ENCOUNTER
Medication:   Requested Prescriptions     Pending Prescriptions Disp Refills    pantoprazole (PROTONIX) 40 MG tablet [Pharmacy Med Name: PANTOPRAZOLE SOD DR 40 MG TAB] 30 tablet 1     Sig: TAKE ONE TABLET BY MOUTH EVERY MORNING BEFORE BREAKFAST      Last Filled:      Patient Phone Number: [phone] (home)     Last appt: 6/6/2022   Next appt: Visit date not found    Last OARRS: No flowsheet data found.   PDMP Monitoring:    Last PDMP Kara March as Reviewed [facility]):  Review User Review Instant Review Result          Preferred Pharmacy:   [facility] [id number] [address] [phone]  [facility]  Phone: [phone] Fax: [phone]

## 2022-08-04 RX ORDER — PANTOPRAZOLE SODIUM 40 MG/1
TABLET, DELAYED RELEASE ORAL
Qty: 30 TABLET | Refills: 1 | Status: SHIPPED | OUTPATIENT
Start: 2022-08-04 | End: 2022-08-25 | Stop reason: SDUPTHER

## 2022-08-05 DIAGNOSIS — I10 ESSENTIAL HYPERTENSION, BENIGN: Chronic | ICD-10-CM

## 2022-08-05 RX ORDER — METOPROLOL SUCCINATE 50 MG/1
TABLET, EXTENDED RELEASE ORAL
Qty: 90 TABLET | Refills: 0 | Status: SHIPPED | OUTPATIENT
Start: 2022-08-05

## 2022-08-05 NOTE — TELEPHONE ENCOUNTER
Medication:   Requested Prescriptions     Pending Prescriptions Disp Refills    metoprolol succinate (TOPROL XL) 50 MG extended release tablet [Pharmacy Med Name: METOPROLOL SUCC ER 50 MG TAB] 90 tablet 0     Sig: TAKE ONE TABLET BY MOUTH DAILY        Patient Phone Number: [phone] (home)     Last appt: 6/6/2022   Next appt: Visit date not found    Last OARRS: No flowsheet data found.   PDMP Monitoring:    Last PDMP Jeanna Childs as Reviewed [facility]):  Review User Review Instant Review Result          Preferred Pharmacy:   Dio Aguayo [id number] - [address] [phone]  [facility] 86 [address]  Phone: [phone] Fax: [phone]    [address] [facility] Delivery (Now [address]Rd Floor Mail Delivery) - Lokesh Avina [phone] - F [phone]  [address] [place] [address]  Phone: [phone] Fax: [phone],4Th FloorAngela [place] [address] [phone] [facility] [phone]  [address] [place] [address]  Phone: [phone] Fax: [phone]     [address] [phone] [facility] [phone]  [address]  [address] [address]  Phone: [phone] Fax: [phone], [facility] [address],Anthony 206 [phone] [facility] [phone]  [address] [address]  Phone: [phone] Fax: [phone]

## 2022-08-25 ENCOUNTER — PATIENT MESSAGE (OUTPATIENT)
Dept: FAMILY MEDICINE CLINIC | Age: 77
End: 2022-08-25

## 2022-08-25 DIAGNOSIS — H81.10 BENIGN PAROXYSMAL POSITIONAL VERTIGO, UNSPECIFIED LATERALITY: Primary | ICD-10-CM

## 2022-08-25 RX ORDER — PANTOPRAZOLE SODIUM 40 MG/1
TABLET, DELAYED RELEASE ORAL
Qty: 90 TABLET | Refills: 0 | Status: SHIPPED | OUTPATIENT
Start: 2022-08-25

## 2022-08-25 NOTE — TELEPHONE ENCOUNTER
Medication:   Requested Prescriptions      No prescriptions requested or ordered in this encounter      Requesting 90 day vs 30    Patient Phone Number: [phone] (home)     Last appt: 6/6/2022   Next appt: Visit date not found    Last OARRS: No flowsheet data found.   PDMP Monitoring:    Last PDMP Mulugeta Crews as Reviewed [facility]):  Review User Review Instant Review Result          Preferred Pharmacy:   [facility] [id number] - [address] [phone]  [facility] 86 [address]  Phone: [phone] Fax: [phone]    [address] Mail Delivery (Now [address] Floor Mail Delivery) - [facility] [phone] - F [phone]  [address] [place] [address]  Phone: [phone] Fax: [phone] [place], [place] [address]e,4Th FloorAmanda [place] [address] [phone] Laurette Cheadle [phone]  [address] [place] [address]  Phone: [phone] Fax: [phone]    [facility] 200 [facility] Herb Elam  [phone] Laurette Cheadle [phone]  [address] Marialuisa [facility] 96 [address]  Phone: [phone] Fax: [phone] [place] [address] [address],[facility] 206 [phone] Laurette Cheadle [phone]  [place] [address]8976 [facility] [address]  Phone: [phone] Fax: [phone]

## 2022-08-25 NOTE — TELEPHONE ENCOUNTER
From: Jez Olvera  To: Dr. Banda Scales: 8/25/2022 10:49 AM EDT  Subject: Lexi Pay PT referral    Could you send a referral for PT (this is for my positional vertigo) to Letha Wong, PT, [facility]. [address] [place] [address]. The Fax number is [phone]. I have an appointment on Monday. Thanks.   Darylene Bow

## 2022-09-01 ENCOUNTER — TELEPHONE (OUTPATIENT)
Dept: FAMILY MEDICINE CLINIC | Age: 77
End: 2022-09-01

## 2022-09-01 NOTE — TELEPHONE ENCOUNTER
Received plan of care/orders from [facility]. Provider signed orders and has been faxed to home care agency.

## 2022-09-06 RX ORDER — TRAZODONE HYDROCHLORIDE 50 MG/1
TABLET ORAL
Qty: 90 TABLET | Refills: 0 | Status: SHIPPED | OUTPATIENT
Start: 2022-09-06 | End: 2022-09-08

## 2022-09-07 ENCOUNTER — TELEPHONE (OUTPATIENT)
Dept: FAMILY MEDICINE CLINIC | Age: 77
End: 2022-09-07

## 2022-09-07 NOTE — TELEPHONE ENCOUNTER
----- Message from Sadie Conn sent at 9/6/2022  3:37 PM EDT -----  Subject: Appointment Request    Reason for Call: Established Patient Appointment needed: Routine Existing   Condition Follow Up    QUESTIONS    Reason for appointment request? Available appointments did not meet   patient need     Additional Information for Provider? patient requesting to be seen before   11/1 for migraines.  he is flexible and can do VV if that helps  ---------------------------------------------------------------------------  --------------  [address] [facility]  [phone]; OK to leave message on voicemail  ---------------------------------------------------------------------------  --------------  SCRIPT ANSWERS  MOJGANID Screen: Dola Friday

## 2022-09-08 ENCOUNTER — OFFICE VISIT (OUTPATIENT)
Dept: FAMILY MEDICINE CLINIC | Age: 77
End: 2022-09-08
Payer: MEDICARE

## 2022-09-08 VITALS
BODY MASS INDEX: 24.94 KG/M2 | WEIGHT: 189 LBS | DIASTOLIC BLOOD PRESSURE: 74 MMHG | TEMPERATURE: 97.9 F | OXYGEN SATURATION: 98 % | HEART RATE: 65 BPM | SYSTOLIC BLOOD PRESSURE: 136 MMHG

## 2022-09-08 DIAGNOSIS — G43.109 OCULAR MIGRAINE: Primary | ICD-10-CM

## 2022-09-08 PROCEDURE — 1123F ACP DISCUSS/DSCN MKR DOCD: CPT | Performed by: FAMILY MEDICINE

## 2022-09-08 PROCEDURE — 99214 OFFICE O/P EST MOD 30 MIN: CPT | Performed by: FAMILY MEDICINE

## 2022-09-08 RX ORDER — AMITRIPTYLINE HYDROCHLORIDE 25 MG/1
25 TABLET, FILM COATED ORAL NIGHTLY
Qty: 30 TABLET | Refills: 1 | Status: SHIPPED | OUTPATIENT
Start: 2022-09-08

## 2022-09-08 NOTE — PROGRESS NOTES
Patient is here today due to having occular migraines. Symptoms been going on bad for the past 2 wk's. Said that he had 5 in those past couple of weeks. States he has had them down to a 0, but then the migraine will come back within short time. Light sensitive, but not sound. Hx migraines - visual disturbance and then will get HA. This time has had where would get one, would go away and then would get another a few hours later. That time got some double vision with it. Both times when working at computer. Gets sensitive to light. HA is unilateral in the eyes. Has taken fioricet but has been trying to not take them. Usually takes one and repeats little later. Will need every 5 days. Has ibuprofen with codeine got from adjust, only takes occasionally. No nausea or vomiting. Originally dx with migraines about 15 years ago then dx with ocular migraines about 8 years ago. Got sumatriptan in past, had script from 2017 that he tried recently and thinks may have helped sort of. Last year with dizziness - had MRI and ESR and CRP  - MRI showed age related changes and some changes that ? Vasculitis, recommended to see neuro and went to [facility], labs were normal.     Feels like worse after on computer. Vitals:    09/08/22 1208   BP: 136/74   Site: Left Upper Arm   Position: Sitting   Cuff Size: Medium Adult   Pulse: 65   Temp: 97.9 °F (36.6 °C)   TempSrc: Infrared   SpO2: 98%   Weight: 189 lb (85.7 kg)     Wt Readings from Last 3 Encounters:   09/08/22 189 lb (85.7 kg)   01/04/22 188 lb (85.3 kg)   08/06/21 191 lb 3.2 oz (86.7 kg)     Body mass index is 24.94 kg/m².   PHQ Scores 7/30/2021 2/21/2019 7/17/2018 7/17/2018   PHQ2 Score 1 0 1 0   PHQ9 Score 1 0 1 0       Interpretation of Total Score Depression Severity: 1-4 = Minimal depression, 5-9 = Mild depression, 10-14 = Moderate depression, 15-19 = Moderately severe depression, 20-27 = Severe depression       GEN: Alert and oriented x 4 NAD, affect appropriate and normal appearing weight, well hydrated, well developed. ASSESSMENT AND PLAN:       Reed Day was seen today for migraine. Diagnoses and all orders for this visit:    Ocular migraine  -     Sedimentation Rate; Future  -     C-Reactive Protein; Future  -     External Referral To Neurology  Check inflammatory markers to confirm not temporal arteritis. See neurology - referral sent to  at pt request  Trial amitriptyline to see if helps  Discussed would avoid imitrex given age and HTN      Other orders  -     amitriptyline (ELAVIL) 25 MG tablet; Take 1 tablet by mouth nightly  -     C-Reactive Protein  -     Sedimentation rate, manual            Return in about 1 month (around 10/8/2022) for AWV, 30 min.          Portions of Note per  Beatrice Carlson CMA AAMA with corrections and edits per Hollie Barragan MD.  I agree with entirety of note and was present and performed history and physical.  I also confirm that the note above accurately reflects all work, treatment, procedures, and medical decision making performed by me, Hollie Barragan MD

## 2022-09-09 LAB
C-REACTIVE PROTEIN WIDE RANGE: 1.78 MG/L
SEDIMENTATION RATE, ERYTHROCYTE: 7 MM/HR (ref 0–20)

## 2022-09-28 ENCOUNTER — PATIENT MESSAGE (OUTPATIENT)
Dept: FAMILY MEDICINE CLINIC | Age: 77
End: 2022-09-28

## 2022-09-28 DIAGNOSIS — K64.9 ACUTE HEMORRHOID: Primary | ICD-10-CM

## 2022-09-28 RX ORDER — HYDROCORTISONE ACETATE 25 MG/1
25 SUPPOSITORY RECTAL 2 TIMES DAILY
Qty: 14 SUPPOSITORY | Refills: 0 | Status: SHIPPED | OUTPATIENT
Start: 2022-09-28 | End: 2022-10-05

## 2022-09-28 RX ORDER — HYDROCORTISONE 25 MG/G
CREAM TOPICAL
Qty: 28 G | Refills: 0 | Status: SHIPPED | OUTPATIENT
Start: 2022-09-28

## 2022-10-14 RX ORDER — TRAZODONE HYDROCHLORIDE 50 MG/1
TABLET ORAL
Qty: 90 TABLET | Refills: 0 | OUTPATIENT
Start: 2022-10-14

## 2022-10-31 ENCOUNTER — PATIENT MESSAGE (OUTPATIENT)
Dept: FAMILY MEDICINE CLINIC | Age: 77
End: 2022-10-31

## 2022-10-31 RX ORDER — TRAZODONE HYDROCHLORIDE 50 MG/1
TABLET ORAL
Qty: 90 TABLET | Refills: 3 | Status: SHIPPED | OUTPATIENT
Start: 2022-10-31

## 2022-10-31 NOTE — TELEPHONE ENCOUNTER
From: Daniela Mckeon  To: Dr. Mickie Gowers: 10/31/2022 11:43 AM EDT  Subject: Meds    I've gone back to the Trazodone, and it's working well, but I need a new prescription phoned in to [facility] since they apparently took the old one out when I was on the other medication. Thanks.   [facility] Staff

## 2022-10-31 NOTE — TELEPHONE ENCOUNTER
Medication:   Requested Prescriptions     Pending Prescriptions Disp Refills    traZODone (DESYREL) 50 MG tablet 90 tablet 0     Sig: TAKE ONE TABLET BY MOUTH ONCE NIGHTLY      Last Filled:  9/6/22    Patient Phone Number: [phone] (home)     Last appt: 9/8/2022   Next appt: 11/8/2022    Last OARRS: No flowsheet data found.   PDMP Monitoring:    Last PDMP Monika pack Reviewed [facility]):  Review User Review Instant Review Result          Preferred Pharmacy:   [facility] [id number] - [address] [phone]  [facility] 86 [address]  Phone: [phone] Fax: [phone] [facility],3Rd Floor Mail Delivery - Dennis Kocher, Susanstawilson [phone] - F [phone]  [address] Dennis Kocher [place] [address]  Phone: [phone] Fax: [phone] [facility],4Th FloorMargaret [place] [address] [phone] Mallorie Tolentino [phone]  [address] [place] [address]  Phone: [phone] Fax: [phone]    [facility] 200 [facility] V[facility]ctor [facility] 62 [phone] Mallorie Tolentino [phone]  [address] [place], [place] [address]aroldo Ba 96 [address]  Phone: [phone] Fax: [phone] 9[address] e [facility] [address],[facility] 206 [phone] Mallorie Tolentino [phone]  [place] [address]24 [facility] [address]  Phone: [phone] Fax: [phone]

## 2022-12-03 DIAGNOSIS — I10 ESSENTIAL HYPERTENSION, BENIGN: Chronic | ICD-10-CM

## 2022-12-05 RX ORDER — METOPROLOL SUCCINATE 50 MG/1
TABLET, EXTENDED RELEASE ORAL
Qty: 90 TABLET | Refills: 0 | Status: SHIPPED | OUTPATIENT
Start: 2022-12-05

## 2022-12-05 NOTE — TELEPHONE ENCOUNTER
Medication:   Requested Prescriptions     Pending Prescriptions Disp Refills    metoprolol succinate (TOPROL XL) 50 MG extended release tablet [Pharmacy Med Name: METOPROLOL SUCC ER 50 MG TAB] 90 tablet 0     Sig: TAKE ONE TABLET BY MOUTH DAILY          Patient Phone Number: [phone] (home)     Last appt: 9/8/2022   Next appt: 12/20/2022    Last OARRS: No flowsheet data found.   PDMP Monitoring:    Last PDMP Dario Plascencia as Reviewed [facility]):  Review User Review Instant Review Result          Preferred Pharmacy:   Carlos Annas [id number] - [address] [phone]  [facility] 86 [address]  Phone: [phone] Fax: [phone]    [address] [facility],3Rd Floor Mail Delivery - [facility] Shanika [phone] - F [phone]  [address] [place] [address]  Phone: [phone] Fax: [phone]Th Floor, [place] [address] [phone] Tiffanie Jaqui [phone]  [address] [place] [address]  Phone: [phone] Fax: [phone]     [address], [facility] 62 [phone] Tiffanie Jaqui [phone]  600 [facility] 96 [address]  Phone: [phone] Fax: [phone] [place] [address] [address],[facility] 206 [phone] Tiffanie Jaqui [phone]  [address] [address]  Phone: [phone] Fax: [phone]

## 2022-12-13 DIAGNOSIS — G47.00 INSOMNIA, UNSPECIFIED TYPE: Primary | ICD-10-CM

## 2022-12-13 RX ORDER — ATORVASTATIN CALCIUM 10 MG/1
TABLET, FILM COATED ORAL
Qty: 90 TABLET | Refills: 3 | Status: SHIPPED | OUTPATIENT
Start: 2022-12-13

## 2022-12-13 RX ORDER — ZOLPIDEM TARTRATE 5 MG/1
TABLET ORAL
Qty: 30 TABLET | Refills: 3 | Status: SHIPPED | OUTPATIENT
Start: 2022-12-13 | End: 2023-01-12

## 2022-12-13 SDOH — HEALTH STABILITY: PHYSICAL HEALTH: ON AVERAGE, HOW MANY DAYS PER WEEK DO YOU ENGAGE IN MODERATE TO STRENUOUS EXERCISE (LIKE A BRISK WALK)?: 3 DAYS

## 2022-12-13 SDOH — HEALTH STABILITY: PHYSICAL HEALTH: ON AVERAGE, HOW MANY MINUTES DO YOU ENGAGE IN EXERCISE AT THIS LEVEL?: 40 MIN

## 2022-12-13 ASSESSMENT — LIFESTYLE VARIABLES
HOW OFTEN DURING THE LAST YEAR HAVE YOU BEEN UNABLE TO REMEMBER WHAT HAPPENED THE NIGHT BEFORE BECAUSE YOU HAD BEEN DRINKING: NEVER
HOW MANY STANDARD DRINKS CONTAINING ALCOHOL DO YOU HAVE ON A TYPICAL DAY: 1
HOW OFTEN DURING THE LAST YEAR HAVE YOU HAD A FEELING OF GUILT OR REMORSE AFTER DRINKING: NEVER
HAS A RELATIVE, FRIEND, DOCTOR, OR ANOTHER HEALTH PROFESSIONAL EXPRESSED CONCERN ABOUT YOUR DRINKING OR SUGGESTED YOU CUT DOWN: 0
HOW OFTEN DURING THE LAST YEAR HAVE YOU NEEDED AN ALCOHOLIC DRINK FIRST THING IN THE MORNING TO GET YOURSELF GOING AFTER A NIGHT OF HEAVY DRINKING: NEVER
HOW OFTEN DURING THE LAST YEAR HAVE YOU FAILED TO DO WHAT WAS NORMALLY EXPECTED FROM YOU BECAUSE OF DRINKING: 0
HAS A RELATIVE, FRIEND, DOCTOR, OR ANOTHER HEALTH PROFESSIONAL EXPRESSED CONCERN ABOUT YOUR DRINKING OR SUGGESTED YOU CUT DOWN: NO
HOW MANY STANDARD DRINKS CONTAINING ALCOHOL DO YOU HAVE ON A TYPICAL DAY: 1 OR 2
HOW OFTEN DO YOU HAVE SIX OR MORE DRINKS ON ONE OCCASION: 1
HOW OFTEN DURING THE LAST YEAR HAVE YOU NEEDED AN ALCOHOLIC DRINK FIRST THING IN THE MORNING TO GET YOURSELF GOING AFTER A NIGHT OF HEAVY DRINKING: 0
HOW OFTEN DURING THE LAST YEAR HAVE YOU HAD A FEELING OF GUILT OR REMORSE AFTER DRINKING: 0
HOW OFTEN DURING THE LAST YEAR HAVE YOU FOUND THAT YOU WERE NOT ABLE TO STOP DRINKING ONCE YOU HAD STARTED: NEVER
HOW OFTEN DURING THE LAST YEAR HAVE YOU BEEN UNABLE TO REMEMBER WHAT HAPPENED THE NIGHT BEFORE BECAUSE YOU HAD BEEN DRINKING: 0
HOW OFTEN DURING THE LAST YEAR HAVE YOU FAILED TO DO WHAT WAS NORMALLY EXPECTED FROM YOU BECAUSE OF DRINKING: NEVER
HAVE YOU OR SOMEONE ELSE BEEN INJURED AS A RESULT OF YOUR DRINKING: NO
HAVE YOU OR SOMEONE ELSE BEEN INJURED AS A RESULT OF YOUR DRINKING: 0
HOW OFTEN DO YOU HAVE A DRINK CONTAINING ALCOHOL: 5
HOW OFTEN DURING THE LAST YEAR HAVE YOU FOUND THAT YOU WERE NOT ABLE TO STOP DRINKING ONCE YOU HAD STARTED: 0
HOW OFTEN DO YOU HAVE A DRINK CONTAINING ALCOHOL: 4 OR MORE TIMES A WEEK

## 2022-12-13 ASSESSMENT — PATIENT HEALTH QUESTIONNAIRE - PHQ9
SUM OF ALL RESPONSES TO PHQ QUESTIONS 1-9: 1
SUM OF ALL RESPONSES TO PHQ QUESTIONS 1-9: 1
1. LITTLE INTEREST OR PLEASURE IN DOING THINGS: 0
2. FEELING DOWN, DEPRESSED OR HOPELESS: 1
SUM OF ALL RESPONSES TO PHQ QUESTIONS 1-9: 1
SUM OF ALL RESPONSES TO PHQ QUESTIONS 1-9: 1
SUM OF ALL RESPONSES TO PHQ9 QUESTIONS 1 & 2: 1

## 2022-12-13 NOTE — TELEPHONE ENCOUNTER
Medication:   Requested Prescriptions     Pending Prescriptions Disp Refills    zolpidem (AMBIEN) 5 MG tablet [Pharmacy Med Name: ZOLPIDEM TARTRATE 5 MG TABLET] 30 tablet      Sig: TAKE ONE TABLET BY MOUTH EVERY NIGHT AT BEDTIME AS NEEDED FOR SLEEP    atorvastatin (LIPITOR) 10 MG tablet [Pharmacy Med Name: ATORVASTATIN 10 MG TABLET] 90 tablet      Sig: TAKE ONE TABLET BY MOUTH DAILY      Last Filled:  5/3/22 with 3 refills    Patient Phone Number: [phone] (home)     Last appt: 9/8/2022   Next appt: 12/20/2022    Last OARRS: No flowsheet data found.   PDMP Monitoring:    Last PDMP Kenneth Cowan as Reviewed [facility]):  Review User Review Instant Review Result          Preferred Pharmacy:   [facility] [id number] - [address] [phone]  [facility] 86 [address]  Phone: [phone] Fax: [phone] 1700 [facility],3Rd Floor Mail Delivery - [facility] [phone] - F [phone]  [address] [place] [address]  Phone: [phone] Fax: [phone] [place] [address],4Th Floor, [place] [address] [phone] [facility] [phone]  [address] [place] [address]  Phone: [phone] Fax: [phone]     [facility] Herb Elam 62 [phone] [facility] [phone]  600 [facility]  [place] 96 [address]  Phone: [phone] Fax: [phone] 1838 9Th [facility] [address],[facility] 206 [phone] [facility] [phone]  [place] 645486 [facility] [address]  Phone: [phone] Fax: [phone]

## 2022-12-20 ENCOUNTER — OFFICE VISIT (OUTPATIENT)
Dept: FAMILY MEDICINE CLINIC | Age: 77
End: 2022-12-20

## 2022-12-20 VITALS
SYSTOLIC BLOOD PRESSURE: 138 MMHG | HEART RATE: 64 BPM | TEMPERATURE: 97.7 F | DIASTOLIC BLOOD PRESSURE: 70 MMHG | OXYGEN SATURATION: 98 % | HEIGHT: 74 IN | WEIGHT: 184.6 LBS | BODY MASS INDEX: 23.69 KG/M2

## 2022-12-20 DIAGNOSIS — Z85.46 HISTORY OF PROSTATE CANCER: ICD-10-CM

## 2022-12-20 DIAGNOSIS — Z00.00 WELL ADULT EXAM: Primary | ICD-10-CM

## 2022-12-20 PROBLEM — C61 PROSTATE CANCER (HCC): Status: RESOLVED | Noted: 2017-10-18 | Resolved: 2022-12-20

## 2022-12-20 PROBLEM — Z86.69 HX OF MIGRAINE HEADACHES: Status: RESOLVED | Noted: 2017-10-18 | Resolved: 2022-12-20

## 2022-12-20 NOTE — PATIENT INSTRUCTIONS
---Bring in copy of living will and healthcare power of  for your chart. Monitor blood pressure 2-3 times a month and drop off readings for review if elevated  Goal BP is <140/80 for many people, good control is <130/80    Need a new cuff?  Check this website to make sure your BP cuff has been validated for accuracy:    www.validatebp.org

## 2022-12-30 LAB
ALBUMIN SERPL-MCNC: 4.5 G/DL (ref 3.5–5)
ALP BLD-CCNC: 87 IU/L (ref 40–129)
ALT SERPL-CCNC: 20 IU/L (ref 10–50)
ANION GAP SERPL CALCULATED.3IONS-SCNC: 5 MMOL/L (ref 6–18)
AST SERPL-CCNC: 21 IU/L (ref 10–50)
BILIRUB SERPL-MCNC: 0.6 MG/DL (ref 0–1)
BUN BLDV-MCNC: 22 MG/DL (ref 8–26)
CALCIUM SERPL-MCNC: 9.5 MG/DL (ref 8.8–10.1)
CHLORIDE BLD-SCNC: 107 MEQ/L (ref 98–111)
CHOLESTEROL, TOTAL: 168 MG/DL
CO2: 30 MMOL/L (ref 21–31)
CREAT SERPL-MCNC: 0.98 MG/DL (ref 0.64–1.27)
EGFR (CKD-EPI): 80 ML/MIN/1.73 M2
GLUCOSE BLD-MCNC: 90 MG/DL (ref 70–99)
HDLC SERPL-MCNC: 65 MG/DL
LDL CHOLESTEROL CALCULATED: 82 MG/DL
NONHDLC SERPL-MCNC: 103 MG/DL
POTASSIUM SERPL-SCNC: 4.9 MEQ/L (ref 3.6–5.1)
SODIUM BLD-SCNC: 142 MEQ/L (ref 135–145)
TOTAL PROTEIN: 6.3 G/DL (ref 6.6–8.7)
TRIGL SERPL-MCNC: 106 MG/DL

## 2023-01-03 RX ORDER — PANTOPRAZOLE SODIUM 40 MG/1
TABLET, DELAYED RELEASE ORAL
Qty: 30 TABLET | Refills: 12 | Status: SHIPPED | OUTPATIENT
Start: 2023-01-03

## 2023-01-03 NOTE — TELEPHONE ENCOUNTER
Medication:   Requested Prescriptions     Pending Prescriptions Disp Refills    pantoprazole (PROTONIX) 40 MG tablet [Pharmacy Med Name: PANTOPRAZOLE SOD DR 40 MG TAB] 30 tablet      Sig: TAKE ONE TABLET BY MOUTH EVERY MORNING BEFORE BREAKFAST          Patient Phone Number: [phone] (home)     Last appt: 12/20/2022   Next appt: Visit date not found    Last OARRS: No flowsheet data found.   PDMP Monitoring:    Last PDMP Nonnie Kocher as Reviewed [facility]):  Review User Review Instant Review Result          Preferred Pharmacy:   [facility] [id number] - [address] [phone]  [facility] 86 [address]  Phone: [phone] Fax: [phone] 1700 [facility],3Rd Floor Mail [facility] [phone] - F [phone]  [address] [place] [address]  Phone: [phone] Fax: [address],4Th FloorBreanna [place] [address] [phone] [facility] [phone]  [address] [place] [address]  Phone: [phone] Fax: [phone]     [address] [phone] [facility] [phone]  [address]  [facility] 96 [address]  Phone: [phone] Fax: [phone] N, [facility] 91 - [facility] [address],[facility] 206 [phone] [facility] [phone]  [place] [address]  Phone: [phone] Fax: [phone]

## 2023-01-03 NOTE — PROGRESS NOTES
[address]   Cardiac Evaluation      Patient: Lucita Renteria  YOB: 1945  Date: 1/5/23     CC: Valvular heart disease     Referring provider: Shane Anthony MD    History of Present Illness:   Mr. Devaughn Beckwith has a history of hypertension, aortic insufficiency/MR, hyperlipidemia. He has no history of coronary artery disease. He was an  at [employer]. [employer] in [employer]; he is retired. He does not smoke. Today, Mr Devaughn Beckwith states his brother recently passed away from colon cancer. His 65 lb lab injured then his back and has lost use of his hind legs. Apurva Salvador has been lifting the dog multiple times per day and caring for him. Apurva Salvador denies chest pain, palpitations, MENEZES, dizziness, or edema. Past Medical History:   has a past medical history of Anxiety state, unspecified, Arthritis, Hyperlipidemia, Hypertension, Hypertrophy of prostate without urinary obstruction and other lower urinary tract symptoms (LUTS), Irritable bowel syndrome, Migraine, unspecified, without mention of intractable migraine without mention of status migrainosus, Mitral valve disorders(424.0), Other and unspecified hyperlipidemia, Prostate cancer ([facility] [address] and Vitreous degeneration. Surgical History:   has a past surgical history that includes Tonsillectomy; Hip Arthroplasty (Right, 11/2018); Knee Arthroplasty (Left, 02/2019); Hydrocele surgery (Right); Cataract removal (Bilateral); Pain management procedure (Right, 12/30/2020); and IR US PLACE FOR RADIATION TX GUIDE PROSTATE.      Current Outpatient Medications   Medication Sig Dispense Refill    pantoprazole (PROTONIX) 40 MG tablet TAKE ONE TABLET BY MOUTH EVERY MORNING BEFORE BREAKFAST 30 tablet 12    zolpidem (AMBIEN) 5 MG tablet TAKE ONE TABLET BY MOUTH EVERY NIGHT AT BEDTIME AS NEEDED FOR SLEEP 30 tablet 3    atorvastatin (LIPITOR) 10 MG tablet TAKE ONE TABLET BY MOUTH DAILY 90 tablet 3    metoprolol succinate (TOPROL XL) 50 MG extended release tablet TAKE ONE TABLET BY MOUTH DAILY 90 tablet 0    traZODone (DESYREL) 50 MG tablet TAKE ONE TABLET BY MOUTH ONCE NIGHTLY 90 tablet 3    hydrocortisone (ANUSOL-HC) 2.5 % CREA rectal cream Apply rectally BID as needed. 28 g 0    butalbital-acetaminophen-caffeine (FIORICET, ESGIC) -40 MG per tablet Take 1 tablet by mouth every 4 hours as needed for Headaches NEEDS APPT BEFORE ANY MORE REFILLS! 180 tablet 0    nystatin-triamcinolone (MYCOLOG) 960245-9.1 UNIT/GM-% ointment Apply topically 2 times daily to the corners of the mouth until improved. 15 g 2    triamcinolone (KENALOG) 0.1 % ointment Apply to affected areas twice daily for up to 2 weeks or until improved. For eczema. 450 g 3    escitalopram (LEXAPRO) 20 MG tablet Take 1 tablet by mouth daily Rx by Dr. Stephan Bloom 30 tablet 0    cyclobenzaprine (FLEXERIL) 10 MG tablet TAKE 1 TABLET BY MOUTH THREE TIMES A DAY (Patient not taking: No sig reported)      IBUPROFEN PO Take 200-400 mg by mouth 2 times daily as needed      LYSINE PO Take 1 tablet by mouth daily        No current facility-administered medications for this visit. Social History:  Social History     Socioeconomic History    Marital status:      Spouse name: Jerry Wolfe, writer    Number of children: Not on file    Years of education: Not on file    Highest education level: Not on file       Retired professor   Social Needs    Financial resource strain: Not on file    Food insecurity:     Worry: Not on file     Inability: Not on file    Transportation needs:     Medical: Not on file     Non-medical: Not on file   Tobacco Use    Smoking status: Never Smoker    Smokeless tobacco: Never Used   Substance and Sexual Activity    Alcohol use:  Yes     Alcohol/week: 0.0 standard drinks     Frequency: 4 or more times a week     Drinks per session: 1 or 2     Binge frequency: Never     Comment: occasional    Drug use: No    Sexual activity: Not on file   Lifestyle    Physical activity:     Days per week: Not on file     Minutes per session: Not on file    Stress: Not on file   Relationships    Social connections:     Talks on phone: Not on file     Gets together: Not on file     Attends [employer] service: Not on file     Active member of club or organization: Not on file     Attends meetings of clubs or organizations: Not on file     Relationship status: Not on file    Intimate partner violence:     Fear of current or ex partner: Not on file     Emotionally abused: Not on file     Physically abused: Not on file     Forced sexual activity: Not on file   Other Topics Concern    Not on file   Social History Narrative    Not on file     Family History:  family history includes Colon Cancer (age of onset: 76) in his brother; Diabetes type 2  in his brother; Drug Abuse in his son; Heart Attack in his father; Heart Disease in his mother; Hypertension in his mother; No Known Problems in his brother; Stroke in his mother. Allergies:  Oxycodone, Molnupiravir, and Wound dressing adhesive     Review of Systems:   Constitutional: there has been no unanticipated weight loss. No change in energy or activity level   Eyes: No visual changes   ENT: No Headaches, hearing loss or vertigo. No mouth sores or sore throat. Cardiovascular: Reviewed in HPI  Respiratory: No cough or wheezing, no sputum production. Gastrointestinal: No abdominal pain, appetite loss, blood in stools. No change in bowel or bladder habits. Genitourinary: No nocturia, dysuria, trouble voiding  Musculoskeletal:  No gait disturbance, weakness or joint complaints. Integumentary: No rash or pruritis. Neurological: No headache, change in muscle strength, numbness or tingling. No change in gait, balance, coordination, mood, affect, memory, mentation, behavior. Psychiatric: No anxiety or depression  Endocrine: No malaise or fever  Hematologic/Lymphatic: No abnormal bruising or bleeding, blood clots or swollen lymph nodes.   Allergic/Immunologic: No nasal congestion or hives. Physical Examination:    Vitals:    01/05/23 0914   BP: 122/72   Site: Left Upper Arm   Position: Sitting   Cuff Size: Medium Adult   Pulse: 64   SpO2: 96%   Weight: 184 lb (83.5 kg)   Height: 6' 2\" (1.88 m)       Body mass index is 23.62 kg/m². Wt Readings from Last 3 Encounters:   01/05/23 184 lb (83.5 kg)   12/20/22 184 lb 9.6 oz (83.7 kg)   09/08/22 189 lb (85.7 kg)      BP Readings from Last 3 Encounters:   01/05/23 122/72   12/20/22 138/70   09/08/22 136/74      Constitutional and General Appearance:  appears stated age  Eyes - no xanthelasma  Respiratory:  Normal excursion and expansion without use of accessory muscles  Resp Auscultation: Normal breath sounds without dullness  Cardiovascular: The apical impulses not displaced  Heart is regular rate and rhythm with normal S1, S2. 2/6 holosystolic murmur in the left lateral decubitus position, 1/6 BARRON more pronounced when bending over, no clearly audible diastolic murmur. PMI is normal  The carotid upstroke is normal, no bruit noted   JVP is not elevated  Peripheral pulses are symmetrical  There is no clubbing, cyanosis of the extremities  No edema  No varicosities  Femoral Arteries: 2+ and equal without bruits  Pedal Pulses: 2+ and equal   Abdomen:  No masses or tenderness  Aorta not palpable  Normal bowel sounds  Neurological/Psychiatric:  Alert and oriented x3  Moves all extremities well  Exhibits normal gait balance and coordination    Assessment/Plan:  1. Essential hypertension, benign: well controlled    2. Aortic valve insufficiency / Mitral valve insufficiency: Stable  Echo 1/4/22: EF 60%, cLVH, grade I DD, Mild prolapse of the posterior leaflet of mitral valve. Moderate mitral regurgitation. Mild-to-moderate aortic regurgitation is present. Pressure half-time is  calculated at 637 msec. The aortic root is mildly dilated. 3.9cm. The ascending aorta is dilated. 4.2cm. Trivial tricuspid regurgitation. RVSP 23mmHg. Trivial pulmonic regurgitation. IVC size is normal (<2.1cm) and collapses > 50% with respiration consistent with normal RA pressure (3mmHg). Echo 3/23/18> EF 55%, MVP, mild to moderate MR, mild to moderate AR. 3. Mixed hyperlipidemia: Labs 12/30/22> , , HDL 65, LDL 82. He takes Lipitor 10mg. PLAN:  Jaden Houston appears stable. No med changes. FU 6 months w/ echo. Scribe's attestation: This note was scribed in the presence of Dr Neelam Soriano MD by Annie Daniel RN. The scribe's documentation has been prepared under my direction and personally reviewed by me in its entirety. I confirm that the note above accurately reflects all work, treatment, procedures, and medical decision making performed by me. Thank you for allowing to me to participate in the care of Areli Maxwell.

## 2023-01-05 ENCOUNTER — OFFICE VISIT (OUTPATIENT)
Dept: CARDIOLOGY CLINIC | Age: 78
End: 2023-01-05
Payer: MEDICARE

## 2023-01-05 VITALS
HEIGHT: 74 IN | OXYGEN SATURATION: 96 % | BODY MASS INDEX: 23.61 KG/M2 | HEART RATE: 64 BPM | DIASTOLIC BLOOD PRESSURE: 72 MMHG | SYSTOLIC BLOOD PRESSURE: 122 MMHG | WEIGHT: 184 LBS

## 2023-01-05 DIAGNOSIS — I10 ESSENTIAL HYPERTENSION, BENIGN: ICD-10-CM

## 2023-01-05 DIAGNOSIS — E78.2 MIXED HYPERLIPIDEMIA: Primary | ICD-10-CM

## 2023-01-05 DIAGNOSIS — I34.0 MITRAL VALVE INSUFFICIENCY, UNSPECIFIED ETIOLOGY: ICD-10-CM

## 2023-01-05 DIAGNOSIS — I35.1 AORTIC VALVE INSUFFICIENCY, ETIOLOGY OF CARDIAC VALVE DISEASE UNSPECIFIED: ICD-10-CM

## 2023-01-05 PROCEDURE — 99214 OFFICE O/P EST MOD 30 MIN: CPT | Performed by: INTERNAL MEDICINE

## 2023-01-05 PROCEDURE — 3074F SYST BP LT 130 MM HG: CPT | Performed by: INTERNAL MEDICINE

## 2023-01-05 PROCEDURE — 3078F DIAST BP <80 MM HG: CPT | Performed by: INTERNAL MEDICINE

## 2023-01-05 PROCEDURE — 1123F ACP DISCUSS/DSCN MKR DOCD: CPT | Performed by: INTERNAL MEDICINE

## 2023-01-24 DIAGNOSIS — I10 ESSENTIAL HYPERTENSION, BENIGN: Chronic | ICD-10-CM

## 2023-01-24 RX ORDER — METOPROLOL SUCCINATE 50 MG/1
TABLET, EXTENDED RELEASE ORAL
Qty: 90 TABLET | Refills: 3 | Status: SHIPPED | OUTPATIENT
Start: 2023-01-24

## 2023-01-24 NOTE — TELEPHONE ENCOUNTER
Medication:   Requested Prescriptions     Pending Prescriptions Disp Refills    metoprolol succinate (TOPROL XL) 50 MG extended release tablet [Pharmacy Med Name: METOPROLOL SUCC ER 50 MG TAB] 90 tablet 0     Sig: TAKE ONE TABLET BY MOUTH DAILY       Patient Phone Number: [phone] (home)     Last appt: 12/20/2022   Next appt: Visit date not found    Last OARRS: No flowsheet data found.   PDMP Monitoring:    Last PDMP Efraín Lingon as Reviewed [facility]):  Review User Review Instant Review Result          Preferred Pharmacy:   [facility] [id number] - [address] [phone]  [facility] 72 [address]  Phone: [phone] Fax: [phone] 1700 [facility],3Rd Floor Mail Delivery - [facility] [phone] - F [phone]  [address] [place] [address]  Phone: [phone] Fax: [phone] [place] [address],4Th Floor, [place] [address] [phone] Hayde [place] [phone]  [address] [place] [address]  Phone: [phone] Fax: [phone]    [facility] [address], [facility] [phone] Hayde [place] [phone]  600 [facility]  [facility] [address]  Phone: [phone] Fax: [phone], [place] 91 - [facility] [address],[facility] 206 [phone] Hayde Denice [phone]  [address] 91 [address]  Phone: [phone] Fax: [phone]

## 2023-05-22 ENCOUNTER — OFFICE VISIT (OUTPATIENT)
Dept: DERMATOLOGY | Age: 78
End: 2023-05-22
Payer: MEDICARE

## 2023-05-22 DIAGNOSIS — D48.5 NEOPLASM OF UNCERTAIN BEHAVIOR OF SKIN: ICD-10-CM

## 2023-05-22 DIAGNOSIS — L57.0 AK (ACTINIC KERATOSIS): Primary | ICD-10-CM

## 2023-05-22 DIAGNOSIS — L82.1 SK (SEBORRHEIC KERATOSIS): ICD-10-CM

## 2023-05-22 DIAGNOSIS — L20.84 INTRINSIC ATOPIC DERMATITIS: ICD-10-CM

## 2023-05-22 PROCEDURE — 17000 DESTRUCT PREMALG LESION: CPT | Performed by: DERMATOLOGY

## 2023-05-22 PROCEDURE — 17003 DESTRUCT PREMALG LES 2-14: CPT | Performed by: DERMATOLOGY

## 2023-05-22 PROCEDURE — 99213 OFFICE O/P EST LOW 20 MIN: CPT | Performed by: DERMATOLOGY

## 2023-05-22 PROCEDURE — 1123F ACP DISCUSS/DSCN MKR DOCD: CPT | Performed by: DERMATOLOGY

## 2023-05-22 PROCEDURE — 69100 BIOPSY OF EXTERNAL EAR: CPT | Performed by: DERMATOLOGY

## 2023-05-22 NOTE — PATIENT INSTRUCTIONS

## 2023-05-22 NOTE — PROGRESS NOTES
[facility]  Kei Haque MD  [address] [place] [address]  1945    69 y.o. male     Date of Visit: 5/22/2023    Chief Complaint: skin lesions    History of Present Illness:    1. He presents today for few persistent scaly lesions on the scalp and left side of the forehead. 2.  He reports an asymptomatic growth on the right hand. 3.  Follow-up for history of atopic dermatitis-still comes and goes, predominantly with change in season and with colder weather. He reports improvement with use of triamcinolone 0.1% ointment. 4.  He also reports a recurrently irritated and enlarging lesion on the left ear. He has a history of a basal cell carcinoma on the left superior helix that was treated with Mohs surgery in 2009. Review of Systems:  Gen: Feels well, good sense of health. Past Medical History, Family History, Surgical History, Medications and Allergies reviewed.     Past Medical History:   Diagnosis Date    Anxiety state, unspecified     Arthritis 03/2017    Hyperlipidemia     Hypertension     Hypertrophy of prostate without urinary obstruction and other lower urinary tract symptoms (LUTS)     Irritable bowel syndrome     Migraine, unspecified, without mention of intractable migraine without mention of status migrainosus     Mitral valve disorders(424.0)     Other and unspecified hyperlipidemia     Prostate cancer ([address]     tx with cyber knife radiation    Vitreous degeneration      Past Surgical History:   Procedure Laterality Date    CATARACT REMOVAL Bilateral     HIP ARTHROPLASTY Right 11/2018    Dr. Clay Simpson [facility] Right     x3 due to infection    IR [address] ARTHROPLASTY Left 02/2019    Dr. Vee Nails Right 12/30/2020    RIGHT L5 AND S1 TRANSFORAMINAL EPIDURAL STEROID INJECTION WITH FLUOROSCOPYR performed by Marivel Olivas MD at [facility]

## 2023-05-25 LAB — DERMATOLOGY PATHOLOGY REPORT: NORMAL

## 2023-07-06 RX ORDER — BUTALBITAL, ACETAMINOPHEN AND CAFFEINE 50; 325; 40 MG/1; MG/1; MG/1
TABLET ORAL
Qty: 180 TABLET | Refills: 0 | Status: SHIPPED | OUTPATIENT
Start: 2023-07-06

## 2023-07-06 NOTE — TELEPHONE ENCOUNTER
Medication:   Requested Prescriptions     Pending Prescriptions Disp Refills    butalbital-acetaminophen-caffeine (FIORICET, ESGIC) -40 MG per tablet [Pharmacy Med Name: BUTALBITAL-ACETAMN-CAF -40 TB] 180 tablet 0     Sig: TAKE ONE TABLET BY MOUTH EVERY 4 HOURS AS NEEDED FOR HEADACHES        Patient Phone Number: [phone] (home)     Last appt: 12/20/2022   Next appt: Visit date not found    Last OARRS: No flowsheet data found.   PDMP Monitoring:    Last PDMP Nilson Moreno as Reviewed [facility]):  Review User Review Instant Review Result          Preferred Pharmacy:   [facility] [id number] - 100 [address] [phone]  [address]Th [address] [address]  Phone: [phone] Fax: [phone]    [address] [place] [address] [phone] - F [phone]  [address] [place] [address]  Phone: [phone] Fax: [phone] [address], [address] [place] [address] [phone] Nayana Pineda [phone]  [facility]0 [facility] 1317 [facility] [address]  Phone: [phone] Fax: [phone]    [facility] [address], [facility] [phone] Nayana Pineda [phone]  [address]  [address] [address]  Phone: [phone] Fax: [phone] [address] 1317 [facility] - [address] [facility] [phone] Nayana Pineda [phone] 10864 [facility] 1317 [facility] [address]  Phone: [phone] Fax: [phone]

## 2023-07-15 ENCOUNTER — PATIENT MESSAGE (OUTPATIENT)
Dept: FAMILY MEDICINE CLINIC | Age: 78
End: 2023-07-15

## 2023-07-17 NOTE — TELEPHONE ENCOUNTER
From: Radha Monroy  To: Dr. Kyaw Penaloza: 7/15/2023 8:05 AM EDT  Subject: Blood Pressure follow up    You had asked me to check blood pressure occasionally after our last visit. I've been doing that recently and it seems to pretty consistently be in the 135- 142 range when I check, so thought I should let you know. I do have an appointment with Dr. Brissa Poe, my caridologist on the 29th so I also will check with her.   Cammy Fink

## 2023-07-18 NOTE — PROGRESS NOTES
3    pantoprazole (PROTONIX) 40 MG tablet TAKE ONE TABLET BY MOUTH EVERY MORNING BEFORE BREAKFAST 30 tablet 12    atorvastatin (LIPITOR) 10 MG tablet TAKE ONE TABLET BY MOUTH DAILY 90 tablet 3    traZODone (DESYREL) 50 MG tablet TAKE ONE TABLET BY MOUTH ONCE NIGHTLY 90 tablet 3    escitalopram (LEXAPRO) 20 MG tablet Take 1 tablet by mouth daily Rx by Dr. Storm Ranks 30 tablet 0    hydrocortisone (ANUSOL-HC) 2.5 % CREA rectal cream Apply rectally BID as needed. 28 g 0    nystatin-triamcinolone (MYCOLOG) 784460-6.1 UNIT/GM-% ointment Apply topically 2 times daily to the corners of the mouth until improved. 15 g 2    triamcinolone (KENALOG) 0.1 % ointment Apply to affected areas twice daily for up to 2 weeks or until improved. For eczema. 450 g 3    IBUPROFEN PO Take 200-400 mg by mouth 2 times daily as needed      LYSINE PO Take 1 tablet by mouth daily        No current facility-administered medications for this visit. Social History:  Social History     Socioeconomic History    Marital status:      Spouse name: Luis Manuel Coleman, writer    Number of children: Not on file    Years of education: Not on file    Highest education level: Not on file       Retired professor   Social Needs    Financial resource strain: Not on file    Food insecurity:     Worry: Not on file     Inability: Not on file    Transportation needs:     Medical: Not on file     Non-medical: Not on file   Tobacco Use    Smoking status: Never Smoker    Smokeless tobacco: Never Used   Substance and Sexual Activity    Alcohol use:  Yes     Alcohol/week: 1 standard drink per day     Frequency: 4 or more times a week     Drinks per session: 1 or 2     Binge frequency: Never     Comment: occasional    Drug use: No    Sexual activity: Not on file   Lifestyle    Physical activity:     Days per week: Not on file     Minutes per session: Not on file    Stress: Not on file   Relationships    Social connections:     Talks on phone: Not on file     Gets together:

## 2023-07-25 RX ORDER — LISINOPRIL 10 MG/1
10 TABLET ORAL DAILY
Qty: 30 TABLET | Refills: 5 | Status: SHIPPED | OUTPATIENT
Start: 2023-07-25

## 2023-07-28 ENCOUNTER — OFFICE VISIT (OUTPATIENT)
Dept: CARDIOLOGY CLINIC | Age: 78
End: 2023-07-28
Payer: MEDICARE

## 2023-07-28 ENCOUNTER — PROCEDURE VISIT (OUTPATIENT)
Dept: CARDIOLOGY CLINIC | Age: 78
End: 2023-07-28

## 2023-07-28 VITALS
BODY MASS INDEX: 24.13 KG/M2 | DIASTOLIC BLOOD PRESSURE: 74 MMHG | HEART RATE: 64 BPM | HEIGHT: 74 IN | SYSTOLIC BLOOD PRESSURE: 134 MMHG | WEIGHT: 188 LBS | OXYGEN SATURATION: 97 %

## 2023-07-28 DIAGNOSIS — I10 ESSENTIAL HYPERTENSION, BENIGN: ICD-10-CM

## 2023-07-28 DIAGNOSIS — I10 ESSENTIAL HYPERTENSION, BENIGN: Primary | ICD-10-CM

## 2023-07-28 DIAGNOSIS — I34.0 MITRAL VALVE INSUFFICIENCY, UNSPECIFIED ETIOLOGY: ICD-10-CM

## 2023-07-28 DIAGNOSIS — I35.1 NONRHEUMATIC AORTIC VALVE INSUFFICIENCY: ICD-10-CM

## 2023-07-28 DIAGNOSIS — E78.2 MIXED HYPERLIPIDEMIA: ICD-10-CM

## 2023-07-28 DIAGNOSIS — I35.1 AORTIC VALVE INSUFFICIENCY, ETIOLOGY OF CARDIAC VALVE DISEASE UNSPECIFIED: ICD-10-CM

## 2023-07-28 LAB
LV EF: 58 %
LVEF MODALITY: NORMAL

## 2023-07-28 PROCEDURE — 99214 OFFICE O/P EST MOD 30 MIN: CPT | Performed by: INTERNAL MEDICINE

## 2023-07-28 PROCEDURE — 1123F ACP DISCUSS/DSCN MKR DOCD: CPT | Performed by: INTERNAL MEDICINE

## 2023-07-28 PROCEDURE — 3078F DIAST BP <80 MM HG: CPT | Performed by: INTERNAL MEDICINE

## 2023-07-28 PROCEDURE — 3075F SYST BP GE 130 - 139MM HG: CPT | Performed by: INTERNAL MEDICINE

## 2023-08-11 ENCOUNTER — OFFICE VISIT (OUTPATIENT)
Dept: FAMILY MEDICINE CLINIC | Age: 78
End: 2023-08-11

## 2023-08-11 VITALS
OXYGEN SATURATION: 98 % | BODY MASS INDEX: 23.86 KG/M2 | TEMPERATURE: 98.2 F | DIASTOLIC BLOOD PRESSURE: 50 MMHG | SYSTOLIC BLOOD PRESSURE: 110 MMHG | HEART RATE: 55 BPM | WEIGHT: 185.8 LBS

## 2023-08-11 DIAGNOSIS — R19.7 DIARRHEA, UNSPECIFIED TYPE: ICD-10-CM

## 2023-08-11 DIAGNOSIS — K21.00 GASTROESOPHAGEAL REFLUX DISEASE WITH ESOPHAGITIS WITHOUT HEMORRHAGE: ICD-10-CM

## 2023-08-11 DIAGNOSIS — I10 ESSENTIAL HYPERTENSION, BENIGN: Chronic | ICD-10-CM

## 2023-08-11 DIAGNOSIS — K58.0 IRRITABLE BOWEL SYNDROME WITH DIARRHEA: Primary | ICD-10-CM

## 2023-08-11 RX ORDER — DICYCLOMINE HYDROCHLORIDE 10 MG/1
10 CAPSULE ORAL 3 TIMES DAILY
Qty: 90 CAPSULE | Refills: 3 | Status: SHIPPED | OUTPATIENT
Start: 2023-08-11

## 2023-08-11 SDOH — ECONOMIC STABILITY: HOUSING INSECURITY
IN THE LAST 12 MONTHS, WAS THERE A TIME WHEN YOU DID NOT HAVE A STEADY PLACE TO SLEEP OR SLEPT IN A SHELTER (INCLUDING NOW)?: NO

## 2023-08-11 SDOH — ECONOMIC STABILITY: INCOME INSECURITY: HOW HARD IS IT FOR YOU TO PAY FOR THE VERY BASICS LIKE FOOD, HOUSING, MEDICAL CARE, AND HEATING?: NOT HARD AT ALL

## 2023-08-11 SDOH — ECONOMIC STABILITY: FOOD INSECURITY: WITHIN THE PAST 12 MONTHS, YOU WORRIED THAT YOUR FOOD WOULD RUN OUT BEFORE YOU GOT MONEY TO BUY MORE.: NEVER TRUE

## 2023-08-11 SDOH — ECONOMIC STABILITY: FOOD INSECURITY: WITHIN THE PAST 12 MONTHS, THE FOOD YOU BOUGHT JUST DIDN'T LAST AND YOU DIDN'T HAVE MONEY TO GET MORE.: NEVER TRUE

## 2023-08-11 ASSESSMENT — PATIENT HEALTH QUESTIONNAIRE - PHQ9
2. FEELING DOWN, DEPRESSED OR HOPELESS: 0
SUM OF ALL RESPONSES TO PHQ QUESTIONS 1-9: 0
SUM OF ALL RESPONSES TO PHQ QUESTIONS 1-9: 0
1. LITTLE INTEREST OR PLEASURE IN DOING THINGS: 0
SUM OF ALL RESPONSES TO PHQ9 QUESTIONS 1 & 2: 0
SUM OF ALL RESPONSES TO PHQ QUESTIONS 1-9: 0
SUM OF ALL RESPONSES TO PHQ QUESTIONS 1-9: 0

## 2023-08-24 ENCOUNTER — TELEPHONE (OUTPATIENT)
Dept: FAMILY MEDICINE CLINIC | Age: 78
End: 2023-08-24

## 2023-08-24 DIAGNOSIS — G47.00 INSOMNIA, UNSPECIFIED TYPE: Primary | ICD-10-CM

## 2023-08-24 RX ORDER — ZOLPIDEM TARTRATE 5 MG/1
TABLET ORAL
Qty: 30 TABLET | Refills: 5 | Status: SHIPPED | OUTPATIENT
Start: 2023-08-24 | End: 2023-09-23

## 2023-08-24 NOTE — TELEPHONE ENCOUNTER
Prior Authorization request received for Zolpidem. Routed to PA department for completion.       ECQ:[id number]

## 2023-08-25 NOTE — TELEPHONE ENCOUNTER
Submitted [facility] for [facility]em [facility]  Key: [id number] STATUS: \"The patient currently has access to the requested medication and a Prior Authorization is not needed for the patient/medication. \"    If this requires a response please respond to the pool ( P MHCX [address]). Thank you please advise patient.

## 2023-09-18 ENCOUNTER — PATIENT MESSAGE (OUTPATIENT)
Dept: FAMILY MEDICINE CLINIC | Age: 78
End: 2023-09-18

## 2023-09-18 DIAGNOSIS — R42 VERTIGO: Primary | ICD-10-CM

## 2023-09-18 NOTE — TELEPHONE ENCOUNTER
From: Osman Miranda  To: Dr. Barnes [place]: 9/18/2023 11:45 AM EDT  Subject: Need referral    I need a referral to Garth Mayberry, at [address], I am having a reoccurrence of some vertigo which she has treated me successfully for in the past. Thanks.  The fax number is [phone]  [facility] I could walk

## 2023-10-06 ENCOUNTER — PATIENT MESSAGE (OUTPATIENT)
Dept: FAMILY MEDICINE CLINIC | Age: 78
End: 2023-10-06

## 2023-11-03 RX ORDER — TRAZODONE HYDROCHLORIDE 50 MG/1
TABLET ORAL
Qty: 90 TABLET | Refills: 3 | Status: SHIPPED | OUTPATIENT
Start: 2023-11-03

## 2023-11-03 NOTE — TELEPHONE ENCOUNTER
Medication:   Requested Prescriptions     Pending Prescriptions Disp Refills    traZODone (DESYREL) 50 MG tablet [Pharmacy Med Name: traZODone 50 MG TABLET] 90 tablet 3     Sig: TAKE ONE TABLET BY MOUTH ONCE NIGHTLY      Last Filled:  10/31/23    Patient Phone Number: [phone] (home)     Last appt: 8/11/2023   Next appt: 12/11/2023    Last OARRS:        No data to display              PDMP Monitoring:    Last PDMP Ziggy Smith as Reviewed [facility]):  Review User Review Instant Review Result          Preferred Pharmacy:   [facility] [id number] - [address] [phone] - f [phone]  [address] [address]  Phone: [phone] Fax: [phone]    [address] [place] [address] Rd [address]  4221 [facility] [address]  Phone: [phone] Fax: [phone], [address] [place] [address] [phone] - f [phone]  2512 [facility] 1317 [facility] [address]  Phone: [phone] Fax: [phone]    [facility] [address] [place] [facility], [address] [phone] - F [phone]  [address]  [address] [place] [address] [address]  Phone: [phone] Fax: [phone] [address] [facility] [phone] [facility] [phone]  [address] [facility] 1317 [facility] [address]  Phone: [phone] Fax: [phone]

## 2023-11-29 RX ORDER — ATORVASTATIN CALCIUM 10 MG/1
TABLET, FILM COATED ORAL
Qty: 90 TABLET | Refills: 3 | Status: SHIPPED | OUTPATIENT
Start: 2023-11-29

## 2023-11-29 NOTE — TELEPHONE ENCOUNTER
Medication:   Requested Prescriptions     Pending Prescriptions Disp Refills    atorvastatin (LIPITOR) 10 MG tablet [Pharmacy Med Name: ATORVASTATIN 10 MG TABLET] 90 tablet 3     Sig: TAKE ONE TABLET BY MOUTH DAILY       Patient Phone Number: [phone] (home)     Last appt: 8/11/2023   Next appt: 12/11/2023    Last OARRS:        No data to display              PDMP Monitoring:    Last PDMP Bernie Au as Reviewed [facility]):  Review User Review Instant Review Result          Preferred Pharmacy:   Crystal Velasquez [id number] - 100 [address] [phone] - F [phone]  [address] [address]  Phone: [phone] Fax: [phone]    [address] [place] [address] 54  7601 [facility] [address]  Phone: [phone] Fax: [phone] Double  Kurt [address] [place] [address] Drive, [address] [place] [address] [phone] - F [phone]  [facility] 1317 [facility] [address]  Phone: [phone] Fax: [phone]    [facility] [address] [place] [address] [phone] - F [phone]  [address]  [address] [address]  Phone: [phone] Fax: [phone] [facility] 100 [facility], [address] [address] [facility] 65 & 82 S [facility] [phone] [facility] [phone] 10864 [facility] 1317 [facility] [address]  Phone: [phone] Fax: [phone]

## 2023-12-05 DIAGNOSIS — I10 ESSENTIAL HYPERTENSION, BENIGN: Chronic | ICD-10-CM

## 2023-12-05 RX ORDER — METOPROLOL SUCCINATE 50 MG/1
TABLET, EXTENDED RELEASE ORAL
Qty: 90 TABLET | Refills: 3 | Status: SHIPPED | OUTPATIENT
Start: 2023-12-05

## 2023-12-05 NOTE — TELEPHONE ENCOUNTER
Medication:   Requested Prescriptions     Pending Prescriptions Disp Refills    metoprolol succinate (TOPROL XL) 50 MG extended release tablet [Pharmacy Med Name: METOPROLOL SUCC ER 50 MG TAB] 90 tablet 3     Sig: TAKE ONE TABLET BY MOUTH DAILY          Patient Phone Number: [phone] (home)     Last appt: 8/11/2023   Next appt: 12/11/2023    Last OARRS:        No data to display              PDMP Monitoring:    Last PDMP Adra Cottondale as Reviewed [facility]):  Review User Review Instant Review Result          Preferred Pharmacy:   [facility] [id number] - 100 [address] [phone] - F [phone]  [address] [address]  Phone: [phone] Fax: [phone]    [address] [place] [address] Rd [address]  4321 [facility] [address]  Phone: [phone] Fax: [phone] [address] Kurt [address] [place] [address] Drive, [address] [place] [address] [phone] - F [phone]  [facility] 1317 [facility] [address]  Phone: [phone] Fax: [phone]    [facility] [address] [place] [facility], [address] [phone] - F [phone]  [address] [place] [address]  Phone: [phone] Fax: [facility] [address] [phone] [facility] [phone] 10864 [facility] [address] [place] [address]  Phone: [phone] Fax: [phone]

## 2023-12-06 LAB
ALBUMIN SERPL-MCNC: 4.8 G/DL (ref 3.5–5.2)
ALP BLD-CCNC: 63 IU/L (ref 40–129)
ALT SERPL-CCNC: 21 IU/L (ref 10–40)
ANION GAP SERPL CALCULATED.3IONS-SCNC: 8 MMOL/L (ref 4–16)
AST SERPL-CCNC: 23 IU/L (ref 10–40)
BASOPHILS ABSOLUTE: 0.1 THOU/MCL (ref 0–0.2)
BASOPHILS ABSOLUTE: 1 %
BILIRUB SERPL-MCNC: 0.5 MG/DL (ref 0–1.2)
BUN BLDV-MCNC: 24 MG/DL (ref 8–26)
CALCIUM SERPL-MCNC: 9.7 MG/DL (ref 8.5–10.4)
CHLORIDE BLD-SCNC: 107 MEQ/L (ref 98–111)
CO2: 28 MMOL/L (ref 21–31)
CREAT SERPL-MCNC: 0.91 MG/DL (ref 0.7–1.3)
EGFR (CKD-EPI): 87 ML/MIN/1.73 M2
EOSINOPHILS ABSOLUTE: 0 THOU/MCL (ref 0.03–0.45)
EOSINOPHILS RELATIVE PERCENT: 1 %
GLUCOSE BLD-MCNC: 96 MG/DL (ref 70–99)
HCT VFR BLD CALC: 48.1 % (ref 40–50)
HEMOGLOBIN: 16 G/DL (ref 13.5–16.5)
LYMPHOCYTES ABSOLUTE: 0.8 THOU/MCL (ref 1–4)
LYMPHOCYTES RELATIVE PERCENT: 17 %
MCH RBC QN AUTO: 30.1 PG (ref 27–33)
MCHC RBC AUTO-ENTMCNC: 33.4 G/DL (ref 32–36)
MCV RBC AUTO: 90.3 FL (ref 82–97)
MONOCYTES # BLD: 10 %
MONOCYTES ABSOLUTE: 0.5 THOU/MCL (ref 0.2–0.9)
NEUTROPHILS ABSOLUTE: 3.2 THOU/MCL (ref 1.8–7.7)
PDW BLD-RTO: 13.7 %
PLATELET # BLD: 171 THOU/MCL (ref 140–375)
PMV BLD AUTO: 8 FL (ref 7.4–11.5)
POTASSIUM SERPL-SCNC: 4.6 MEQ/L (ref 3.6–5.1)
RBC # BLD: 5.33 MIL/MCL (ref 4.4–5.8)
SEG NEUTROPHILS: 71 %
SODIUM BLD-SCNC: 143 MEQ/L (ref 135–145)
TOTAL PROTEIN: 6.4 G/DL (ref 6.4–8.3)
WBC # BLD: 4.5 THOU/MCL (ref 3.6–10.5)

## 2023-12-06 NOTE — PATIENT INSTRUCTIONS
--Get your Covid vaccine this fall. --Bring in copy of living will and healthcare power of  for your chart.

## 2023-12-08 NOTE — PROGRESS NOTES
get up and go unsteady or longer than 20 seconds: No      Assessment/Plan:    Amelia Moreland was seen today for medicare awv. Diagnoses and all orders for this visit:    Well adult exam  Recommended screenings discussed and ordered if patient agreed  Recommended vaccinations discussed and ordered if patient agreed  Encouraged healthy diet   Encouraged regular exercise and maintaining a healthy weight    Medicare Safety Interventions: Home safety tips provided  Individualized [address] included in patient instructions and AVS    Essential hypertension, benign  -Stable, continue current medications. Reviewed pre-visit labs with patient. (CMP and CBC) Review of these labs contributed to MDM. Labs good    Mixed hyperlipidemia  -Stable, continue current medications. Anxiety state  -Stable, continue current medications. -follows with psych as well    Migraine without aura and without status migrainosus, not intractable  -Stable, continue current medications. Insomnia, unspecified type  -Stable, continue current medications. Controlled Substances Monitoring:   OARRS report reviewed  Periodic Controlled Substance Monitoring: No signs of potential drug abuse or diversion identified. (Nubia Roy MD)      Irritable bowel syndrome with diarrhea  -Stable, continue current medications.  -discussed can take up to 3 times a day as needed            Return in about 1 year (around 12/11/2024) for AWV, 30 min.              Portions of Note per  All Daniels CMA AAMA with corrections and edits per Nubia Roy MD.  I agree with entirety of note and was present and performed history and physical.  I also confirm that the note above accurately reflects all work, treatment, procedures, and medical decision making performed by me, Nubia Roy MD

## 2023-12-09 SDOH — HEALTH STABILITY: PHYSICAL HEALTH: ON AVERAGE, HOW MANY MINUTES DO YOU ENGAGE IN EXERCISE AT THIS LEVEL?: 60 MIN

## 2023-12-09 SDOH — HEALTH STABILITY: PHYSICAL HEALTH: ON AVERAGE, HOW MANY DAYS PER WEEK DO YOU ENGAGE IN MODERATE TO STRENUOUS EXERCISE (LIKE A BRISK WALK)?: 6 DAYS

## 2023-12-09 ASSESSMENT — PATIENT HEALTH QUESTIONNAIRE - PHQ9
2. FEELING DOWN, DEPRESSED OR HOPELESS: 1
SUM OF ALL RESPONSES TO PHQ QUESTIONS 1-9: 1
1. LITTLE INTEREST OR PLEASURE IN DOING THINGS: 0
SUM OF ALL RESPONSES TO PHQ9 QUESTIONS 1 & 2: 1

## 2023-12-09 ASSESSMENT — LIFESTYLE VARIABLES
HOW OFTEN DO YOU HAVE A DRINK CONTAINING ALCOHOL: 4 OR MORE TIMES A WEEK
HOW OFTEN DURING THE LAST YEAR HAVE YOU NEEDED AN ALCOHOLIC DRINK FIRST THING IN THE MORNING TO GET YOURSELF GOING AFTER A NIGHT OF HEAVY DRINKING: 0
HAS A RELATIVE, FRIEND, DOCTOR, OR ANOTHER HEALTH PROFESSIONAL EXPRESSED CONCERN ABOUT YOUR DRINKING OR SUGGESTED YOU CUT DOWN: 0
HOW OFTEN DURING THE LAST YEAR HAVE YOU BEEN UNABLE TO REMEMBER WHAT HAPPENED THE NIGHT BEFORE BECAUSE YOU HAD BEEN DRINKING: 0
HAS A RELATIVE, FRIEND, DOCTOR, OR ANOTHER HEALTH PROFESSIONAL EXPRESSED CONCERN ABOUT YOUR DRINKING OR SUGGESTED YOU CUT DOWN: NO
HOW OFTEN DURING THE LAST YEAR HAVE YOU FOUND THAT YOU WERE NOT ABLE TO STOP DRINKING ONCE YOU HAD STARTED: 0
HAVE YOU OR SOMEONE ELSE BEEN INJURED AS A RESULT OF YOUR DRINKING: NO
HOW OFTEN DURING THE LAST YEAR HAVE YOU BEEN UNABLE TO REMEMBER WHAT HAPPENED THE NIGHT BEFORE BECAUSE YOU HAD BEEN DRINKING: NEVER
HOW MANY STANDARD DRINKS CONTAINING ALCOHOL DO YOU HAVE ON A TYPICAL DAY: 1 OR 2
HOW OFTEN DURING THE LAST YEAR HAVE YOU NEEDED AN ALCOHOLIC DRINK FIRST THING IN THE MORNING TO GET YOURSELF GOING AFTER A NIGHT OF HEAVY DRINKING: NEVER
HOW OFTEN DO YOU HAVE A DRINK CONTAINING ALCOHOL: 5
HOW MANY STANDARD DRINKS CONTAINING ALCOHOL DO YOU HAVE ON A TYPICAL DAY: 1
HOW OFTEN DURING THE LAST YEAR HAVE YOU HAD A FEELING OF GUILT OR REMORSE AFTER DRINKING: 0
HOW OFTEN DURING THE LAST YEAR HAVE YOU FAILED TO DO WHAT WAS NORMALLY EXPECTED FROM YOU BECAUSE OF DRINKING: NEVER
HOW OFTEN DURING THE LAST YEAR HAVE YOU HAD A FEELING OF GUILT OR REMORSE AFTER DRINKING: NEVER
HOW OFTEN DO YOU HAVE SIX OR MORE DRINKS ON ONE OCCASION: 1
HOW OFTEN DURING THE LAST YEAR HAVE YOU FOUND THAT YOU WERE NOT ABLE TO STOP DRINKING ONCE YOU HAD STARTED: NEVER
HAVE YOU OR SOMEONE ELSE BEEN INJURED AS A RESULT OF YOUR DRINKING: 0
HOW OFTEN DURING THE LAST YEAR HAVE YOU FAILED TO DO WHAT WAS NORMALLY EXPECTED FROM YOU BECAUSE OF DRINKING: 0

## 2023-12-11 ENCOUNTER — OFFICE VISIT (OUTPATIENT)
Dept: FAMILY MEDICINE CLINIC | Age: 78
End: 2023-12-11
Payer: MEDICARE

## 2023-12-11 VITALS
SYSTOLIC BLOOD PRESSURE: 136 MMHG | HEART RATE: 69 BPM | TEMPERATURE: 97.5 F | OXYGEN SATURATION: 98 % | BODY MASS INDEX: 24.28 KG/M2 | WEIGHT: 183.2 LBS | HEIGHT: 73 IN | DIASTOLIC BLOOD PRESSURE: 80 MMHG

## 2023-12-11 DIAGNOSIS — I10 ESSENTIAL HYPERTENSION, BENIGN: Chronic | ICD-10-CM

## 2023-12-11 DIAGNOSIS — G47.00 INSOMNIA, UNSPECIFIED TYPE: ICD-10-CM

## 2023-12-11 DIAGNOSIS — Z00.00 WELL ADULT EXAM: Primary | ICD-10-CM

## 2023-12-11 DIAGNOSIS — K58.0 IRRITABLE BOWEL SYNDROME WITH DIARRHEA: ICD-10-CM

## 2023-12-11 DIAGNOSIS — G43.009 MIGRAINE WITHOUT AURA AND WITHOUT STATUS MIGRAINOSUS, NOT INTRACTABLE: ICD-10-CM

## 2023-12-11 DIAGNOSIS — F41.1 ANXIETY STATE: ICD-10-CM

## 2023-12-11 DIAGNOSIS — E78.2 MIXED HYPERLIPIDEMIA: ICD-10-CM

## 2023-12-11 PROCEDURE — 99214 OFFICE O/P EST MOD 30 MIN: CPT | Performed by: FAMILY MEDICINE

## 2023-12-11 PROCEDURE — 3075F SYST BP GE 130 - 139MM HG: CPT | Performed by: FAMILY MEDICINE

## 2023-12-11 PROCEDURE — 3079F DIAST BP 80-89 MM HG: CPT | Performed by: FAMILY MEDICINE

## 2023-12-11 PROCEDURE — G0439 PPPS, SUBSEQ VISIT: HCPCS | Performed by: FAMILY MEDICINE

## 2023-12-11 PROCEDURE — 1123F ACP DISCUSS/DSCN MKR DOCD: CPT | Performed by: FAMILY MEDICINE

## 2024-01-08 RX ORDER — LISINOPRIL 10 MG/1
10 TABLET ORAL DAILY
Qty: 30 TABLET | Refills: 5 | Status: SHIPPED | OUTPATIENT
Start: 2024-01-08

## 2024-01-08 RX ORDER — PANTOPRAZOLE SODIUM 40 MG/1
TABLET, DELAYED RELEASE ORAL
Qty: 30 TABLET | Refills: 12 | Status: SHIPPED | OUTPATIENT
Start: 2024-01-08

## 2024-01-08 RX ORDER — DICYCLOMINE HYDROCHLORIDE 10 MG/1
CAPSULE ORAL
Qty: 90 CAPSULE | Refills: 3 | Status: SHIPPED | OUTPATIENT
Start: 2024-01-08

## 2024-01-08 NOTE — TELEPHONE ENCOUNTER
Medication:   Requested Prescriptions     Pending Prescriptions Disp Refills    pantoprazole (PROTONIX) 40 MG tablet [Pharmacy Med Name: PANTOPRAZOLE SOD DR 40 MG TAB] 30 tablet 12     Sig: TAKE ONE TABLET BY MOUTH EVERY MORNING BEFORE BREAKFAST    lisinopril (PRINIVIL;ZESTRIL) 10 MG tablet [Pharmacy Med Name: LISINOPRIL 10 MG TABLET] 30 tablet 5     Sig: TAKE 1 TABLET BY MOUTH DAILY    dicyclomine (BENTYL) 10 MG capsule [Pharmacy Med Name: DICYCLOMINE 10 MG CAPSULE] 90 capsule 3     Sig: TAKE ONE CAPSULE BY MOUTH THREE TIMES A DAY IN THE MORNING, AT NOON AND AT BEDTIME       Patient Phone Number: [phone] (home)     Last appt: 12/11/2023   Next appt: Visit date not found    Last OARRS:       12/11/2023     1:55 PM   RX Monitoring   Periodic Controlled Substance Monitoring No signs of potential drug abuse or diversion identified.     PDMP Monitoring:    Last PDMP Del as Reviewed ([place]):  Review User Review Instant Review Result   SHAHEEN REYNA 12/11/2023  1:55 PM Reviewed PDMP [1]     Preferred Pharmacy:   [facility] [id number] - [place], [place] - [address] S [facility] [phone] - F [phone]  [address] [facility] [address]  Phone: [phone] Fax: [phone]    [facility] Mail Delivery - [place], [place] - 9826 [facility] [phone] - F [phone]  9[address] [facility] [address]  Phone: [phone] Fax: [phone]    [facility][id number] - [place], [place] - [address] [address] [facility] - P [phone] - F [phone]  [address] [address] [facility] [address]  Phone: [phone] Fax: [phone]    [facility] [address] IN [facility] - [place] - [address] - P [phone] - F [phone]  [address] [place] [address]  Phone: [phone] Fax: [phone]    [facility] #[id number] - [place], [place] - 99[address]  [facility] - P [phone] - F [phone]  [address] [place] [address] [facility] [address]  Phone: [phone] Fax: [phone]

## 2024-04-19 DIAGNOSIS — G47.00 INSOMNIA, UNSPECIFIED TYPE: ICD-10-CM

## 2024-04-19 RX ORDER — ZOLPIDEM TARTRATE 5 MG/1
TABLET ORAL
Qty: 30 TABLET | Refills: 5 | Status: SHIPPED | OUTPATIENT
Start: 2024-04-19 | End: 2024-05-19

## 2024-04-19 NOTE — TELEPHONE ENCOUNTER
Medication:   Requested Prescriptions     Pending Prescriptions Disp Refills    zolpidem (AMBIEN) 5 MG tablet [Pharmacy Med Name: ZOLPIDEM TARTRATE 5 MG TABLET] 30 tablet      Sig: TAKE ONE TABLET BY MOUTH EVERY NIGHT AT BEDTIME AS NEEDED FOR SLEEP      Last Filled:  8/24/23 with 5 refills    Patient Phone Number: [phone] (home)     Last appt: 12/11/2023   Next appt: Visit date not found    Last OARRS:       12/11/2023     1:55 PM   RX Monitoring   Periodic Controlled Substance Monitoring No signs of potential drug abuse or diversion identified.     PDMP Monitoring:    Last PDMP Del as Reviewed ([place]):  Review User Review Instant Review Result   SHAHEEN REYNA 12/11/2023  1:55 PM Reviewed PDMP [1]     Preferred Pharmacy:   [facility] [id number] [place], [place] - 300 [facility] [phone] - F [phone]  300 S [facility] [address]  Phone: [phone] Fax: [phone]    [facility] Mail Delivery - [facility] 0760 [facility] [phone] - F [phone]  9820 [facility] [address]  Phone: [phone] Fax: [phone]    [facility][id number] - [place], [place] - [address] [facility] [phone] - F [phone]  [address] [facility] [address]  Phone: [phone] Fax: [phone]    [facility] [address] IN [facility] - [place] - [address] - P [phone] - F [phone]  [address] [place] [address]  Phone: [phone] Fax: [phone]    [facility] #[id number] - [place], [place] - [address] [place] [address] BY S - P [phone] - F [phone]  [address] [place] [address] [facility] [address]  Phone: [phone] Fax: [phone]

## 2024-05-16 ENCOUNTER — TELEPHONE (OUTPATIENT)
Dept: ADMINISTRATIVE | Age: 79
End: 2024-05-16

## 2024-05-16 NOTE — TELEPHONE ENCOUNTER
Submitted PA for Dicyclomine HCl 10MG capsules  Via CM Key: [id number] STATUS: PENDING.    Follow up done daily; if no decision with in three days we will refax.  If another three days goes by with no decision will call the insurance for status.

## 2024-05-17 NOTE — TELEPHONE ENCOUNTER
APPROVAL for Dicyclomine HCl 10MG capsules 01/01/24-12/31/24; letter attached.    If this requires a response please respond to the pool ( P MHCX PSC MEDICATION PRE-AUTH).      Thank you please advise patient.

## 2024-05-22 ENCOUNTER — OFFICE VISIT (OUTPATIENT)
Dept: DERMATOLOGY | Age: 79
End: 2024-05-22
Payer: MEDICARE

## 2024-05-22 DIAGNOSIS — L20.84 INTRINSIC ATOPIC DERMATITIS: ICD-10-CM

## 2024-05-22 DIAGNOSIS — L57.0 AK (ACTINIC KERATOSIS): ICD-10-CM

## 2024-05-22 DIAGNOSIS — L82.1 SK (SEBORRHEIC KERATOSIS): ICD-10-CM

## 2024-05-22 DIAGNOSIS — K13.0 ANGULAR CHEILITIS: Primary | ICD-10-CM

## 2024-05-22 PROCEDURE — 99213 OFFICE O/P EST LOW 20 MIN: CPT | Performed by: DERMATOLOGY

## 2024-05-22 PROCEDURE — 1123F ACP DISCUSS/DSCN MKR DOCD: CPT | Performed by: DERMATOLOGY

## 2024-05-22 RX ORDER — NYSTATIN AND TRIAMCINOLONE ACETONIDE 100000; 1 [USP'U]/G; MG/G
OINTMENT TOPICAL
Qty: 15 G | Refills: 2 | Status: SHIPPED | OUTPATIENT
Start: 2024-05-22

## 2024-05-22 NOTE — PROGRESS NOTES
[facility]  Jamarcus De La Paz MD  [phone]      Juan Howard  1945    78 y.o. male     Date of Visit: 5/22/2024    Chief Complaint: skin lesions    History of Present Illness:    1.  He has a history of angular cheilitis-responds well to intermittent use of nystatin triamcinolone ointment.    2.  He has a history of atopic dermatitis-controlled right now.  Uses triamcinolone 0.1% ointment intermittently with improvement.    3.  He has several growths on the back and extremities.    4.  He has a couple of asymptomatic lesions on the scalp and right temple.    He has a history of a basal cell carcinoma on the left superior helix that was treated with Mohs surgery in 2009.       Review of Systems:  Gen: Feels well, good sense of health.    Past Medical History, Family History, Surgical History, Medications and Allergies reviewed.    Past Medical History:   Diagnosis Date    Anxiety state, unspecified     Arthritis 03/2017    Hyperlipidemia     Hypertension     Hypertrophy of prostate without urinary obstruction and other lower urinary tract symptoms (LUTS)     Irritable bowel syndrome     Migraine, unspecified, without mention of intractable migraine without mention of status migrainosus     Mitral valve disorders(424.0)     Other and unspecified hyperlipidemia     Prostate cancer (HCC)     tx with cyber knife radiation    Vitreous degeneration      Past Surgical History:   Procedure Laterality Date    CATARACT REMOVAL Bilateral     [facility] Right     x3 due to infection    IR US PLACE FOR RADIATION TX GUIDE PROSTATE      Cyberknife    KNEE ARTHROPLASTY Left 02/2019    Dr. Elaine    PAIN MANAGEMENT PROCEDURE Right 12/30/2020    RIGHT L5 AND S1 TRANSFORAMINAL EPIDURAL STEROID INJECTION WITH FLUOROSCOPYR performed by Radhika Selby MD at [facility]    TONSILLECTOMY         Allergies   Allergen Reactions    Oxycodone

## 2024-06-10 ENCOUNTER — PATIENT MESSAGE (OUTPATIENT)
Dept: FAMILY MEDICINE CLINIC | Age: 79
End: 2024-06-10

## 2024-06-10 DIAGNOSIS — I10 ESSENTIAL HYPERTENSION, BENIGN: Chronic | ICD-10-CM

## 2024-06-10 RX ORDER — METOPROLOL SUCCINATE 50 MG/1
50 TABLET, EXTENDED RELEASE ORAL DAILY
Qty: 90 TABLET | Refills: 3 | Status: SHIPPED | OUTPATIENT
Start: 2024-06-10

## 2024-06-10 NOTE — TELEPHONE ENCOUNTER
From: Juan Howard  To: Dr. Julieth Hood  Sent: 6/10/2024 11:16 AM EDT  Subject: Refill    Went to get a refill on my metoprolol succinate 50 MG extended release tablet and told me a needed a new preseecrption for it. Last refill was in July of 23.  Thanks.  Juan

## 2024-06-12 RX ORDER — BUTALBITAL, ACETAMINOPHEN AND CAFFEINE 50; 325; 40 MG/1; MG/1; MG/1
TABLET ORAL
Qty: 180 TABLET | Refills: 0 | Status: SHIPPED | OUTPATIENT
Start: 2024-06-12

## 2024-07-15 RX ORDER — LISINOPRIL 10 MG/1
10 TABLET ORAL DAILY
Qty: 90 TABLET | Refills: 1 | Status: SHIPPED | OUTPATIENT
Start: 2024-07-15

## 2024-07-15 NOTE — TELEPHONE ENCOUNTER
Medication:   Requested Prescriptions     Pending Prescriptions Disp Refills    lisinopril (PRINIVIL;ZESTRIL) 10 MG tablet [Pharmacy Med Name: LISINOPRIL 10 MG TABLET] 90 tablet      Sig: TAKE 1 TABLET BY MOUTH DAILY          Patient Phone Number: [phone] (home)     Last appt: 12/11/2023   Next appt: Visit date not found    Last OARRS:       12/11/2023     1:55 PM   RX Monitoring   Periodic Controlled Substance Monitoring No signs of potential drug abuse or diversion identified.     PDMP Monitoring:    Last PDMP Del as Reviewed ([place]):  Review User Review Instant Review Result   SHAHEEN REYNA 12/11/2023  1:55 PM Reviewed PDMP [1]     Preferred Pharmacy:   [facility] [id number] - [facility] [address] [facility] [phone] - F [phone]  [address] [facility] [address]  Phone: [phone] Fax: [phone]    [facility] - [place], [place] - [address] - P [phone] - F [phone]  [address] [facility] [address]  Phone: [phone] Fax: [phone]    [id number] - [place], [place] - [address] [address] [address] - P [phone] - F [phone]  [address] [facility] [address]  Phone: [phone] Fax: [phone]    [facility] [address] [facility] - [place], [place] - [address] - P [phone] - F [phone]  [facility] [place] [address]  Phone: [phone] Fax: [phone]    [facility][id number] - [place], [place] - [address] [address] [address] - P [phone] - F [phone]  [address] [address] [facility] [address]  Phone: [phone] Fax: [phone]

## 2024-07-19 NOTE — PROGRESS NOTES
artery systolic pressure is normal at 20-26 mmHg assuming a right atrial pressure of 3 mmHg. The left atrium is mildly dilated. The aortic root is mildly dilated (4.1 cm). The ascending aorta is mildly dilated.4.4 Cm  Echo 1/4/22: EF 60%, cLVH, grade I DD, Mild prolapse of the posterior leaflet of mitral valve.  Moderate mitral regurgitation. Mild-to-moderate aortic regurgitation is present. Pressure half-time is  calculated at 637 msec. The aortic root is mildly dilated. 3.9cm. The ascending aorta is dilated.4.2cm. Trivial tricuspid regurgitation. RVSP 23mmHg. Trivial pulmonic regurgitation. IVC size is normal (<2.1cm) and collapses > 50% with respiration consistent with normal RA pressure (3mmHg).  Echo 3/23/18> EF 55%, MVP, mild to moderate MR, mild to moderate AR.   3. Mixed hyperlipidemia: Labs 12/30/22> , , HDL 65, LDL 82. He takes Lipitor 10mg, will repeat        PLAN:  Repeat lipids. No medication changes. Encouraged to stay active with regular exercise. FU 1 year.       Scribe's attestation: This note was scribed in the presence of Dr Davin OBRIEN by Malaika Sheikh, ZAC.The scribe's documentation has been prepared under my direction and personally reviewed by me in its entirety. I confirm that the note above accurately reflects all work, treatment, procedures, and medical decision making performed by me.       Thank you for allowing to me to participate in the care of Juan Howard.

## 2024-08-13 ENCOUNTER — OFFICE VISIT (OUTPATIENT)
Dept: CARDIOLOGY CLINIC | Age: 79
End: 2024-08-13
Payer: MEDICARE

## 2024-08-13 ENCOUNTER — PATIENT MESSAGE (OUTPATIENT)
Dept: FAMILY MEDICINE CLINIC | Age: 79
End: 2024-08-13

## 2024-08-13 VITALS
DIASTOLIC BLOOD PRESSURE: 72 MMHG | BODY MASS INDEX: 22.88 KG/M2 | WEIGHT: 184 LBS | HEART RATE: 56 BPM | HEIGHT: 75 IN | OXYGEN SATURATION: 97 % | SYSTOLIC BLOOD PRESSURE: 124 MMHG

## 2024-08-13 DIAGNOSIS — I35.1 AORTIC VALVE INSUFFICIENCY, ETIOLOGY OF CARDIAC VALVE DISEASE UNSPECIFIED: ICD-10-CM

## 2024-08-13 DIAGNOSIS — I10 ESSENTIAL HYPERTENSION, BENIGN: Primary | ICD-10-CM

## 2024-08-13 DIAGNOSIS — E78.2 MIXED HYPERLIPIDEMIA: ICD-10-CM

## 2024-08-13 DIAGNOSIS — I34.0 MITRAL VALVE INSUFFICIENCY, UNSPECIFIED ETIOLOGY: ICD-10-CM

## 2024-08-13 PROCEDURE — 3078F DIAST BP <80 MM HG: CPT | Performed by: INTERNAL MEDICINE

## 2024-08-13 PROCEDURE — 3074F SYST BP LT 130 MM HG: CPT | Performed by: INTERNAL MEDICINE

## 2024-08-13 PROCEDURE — 1123F ACP DISCUSS/DSCN MKR DOCD: CPT | Performed by: INTERNAL MEDICINE

## 2024-08-13 PROCEDURE — 99214 OFFICE O/P EST MOD 30 MIN: CPT | Performed by: INTERNAL MEDICINE

## 2024-08-15 NOTE — TELEPHONE ENCOUNTER
Pt returned call. Pt states that he got a tetanus shot at the [facility] and no longer needs an appointment. Pt had no further questions or concerns at this time.

## 2024-10-21 RX ORDER — DICYCLOMINE HYDROCHLORIDE 10 MG/1
CAPSULE ORAL
Qty: 90 CAPSULE | Refills: 1 | Status: SHIPPED | OUTPATIENT
Start: 2024-10-21

## 2024-10-21 RX ORDER — TRAZODONE HYDROCHLORIDE 50 MG/1
TABLET, FILM COATED ORAL
Qty: 90 TABLET | Refills: 0 | Status: SHIPPED | OUTPATIENT
Start: 2024-10-21

## 2024-11-14 RX ORDER — ATORVASTATIN CALCIUM 10 MG/1
TABLET, FILM COATED ORAL
Qty: 90 TABLET | Refills: 3 | Status: SHIPPED | OUTPATIENT
Start: 2024-11-14

## 2024-12-09 SDOH — ECONOMIC STABILITY: TRANSPORTATION INSECURITY
IN THE PAST 12 MONTHS, HAS LACK OF TRANSPORTATION KEPT YOU FROM MEETINGS, WORK, OR FROM GETTING THINGS NEEDED FOR DAILY LIVING?: NO

## 2024-12-09 SDOH — ECONOMIC STABILITY: FOOD INSECURITY: WITHIN THE PAST 12 MONTHS, THE FOOD YOU BOUGHT JUST DIDN'T LAST AND YOU DIDN'T HAVE MONEY TO GET MORE.: NEVER TRUE

## 2024-12-09 SDOH — ECONOMIC STABILITY: FOOD INSECURITY: WITHIN THE PAST 12 MONTHS, YOU WORRIED THAT YOUR FOOD WOULD RUN OUT BEFORE YOU GOT MONEY TO BUY MORE.: NEVER TRUE

## 2024-12-09 SDOH — HEALTH STABILITY: PHYSICAL HEALTH: ON AVERAGE, HOW MANY DAYS PER WEEK DO YOU ENGAGE IN MODERATE TO STRENUOUS EXERCISE (LIKE A BRISK WALK)?: 6 DAYS

## 2024-12-09 SDOH — HEALTH STABILITY: PHYSICAL HEALTH: ON AVERAGE, HOW MANY MINUTES DO YOU ENGAGE IN EXERCISE AT THIS LEVEL?: 60 MIN

## 2024-12-09 SDOH — ECONOMIC STABILITY: INCOME INSECURITY: HOW HARD IS IT FOR YOU TO PAY FOR THE VERY BASICS LIKE FOOD, HOUSING, MEDICAL CARE, AND HEATING?: NOT HARD AT ALL

## 2024-12-09 ASSESSMENT — PATIENT HEALTH QUESTIONNAIRE - PHQ9
2. FEELING DOWN, DEPRESSED OR HOPELESS: SEVERAL DAYS
1. LITTLE INTEREST OR PLEASURE IN DOING THINGS: NOT AT ALL
SUM OF ALL RESPONSES TO PHQ QUESTIONS 1-9: 1
SUM OF ALL RESPONSES TO PHQ9 QUESTIONS 1 & 2: 1
SUM OF ALL RESPONSES TO PHQ QUESTIONS 1-9: 1

## 2024-12-09 ASSESSMENT — LIFESTYLE VARIABLES
HAS A RELATIVE, FRIEND, DOCTOR, OR ANOTHER HEALTH PROFESSIONAL EXPRESSED CONCERN ABOUT YOUR DRINKING OR SUGGESTED YOU CUT DOWN: NO
HOW OFTEN DURING THE LAST YEAR HAVE YOU HAD A FEELING OF GUILT OR REMORSE AFTER DRINKING: NEVER
HOW MANY STANDARD DRINKS CONTAINING ALCOHOL DO YOU HAVE ON A TYPICAL DAY: 1
HAS A RELATIVE, FRIEND, DOCTOR, OR ANOTHER HEALTH PROFESSIONAL EXPRESSED CONCERN ABOUT YOUR DRINKING OR SUGGESTED YOU CUT DOWN: NO
HOW OFTEN DURING THE LAST YEAR HAVE YOU FOUND THAT YOU WERE NOT ABLE TO STOP DRINKING ONCE YOU HAD STARTED: NEVER
HOW OFTEN DURING THE LAST YEAR HAVE YOU FAILED TO DO WHAT WAS NORMALLY EXPECTED FROM YOU BECAUSE OF DRINKING: NEVER
HOW OFTEN DO YOU HAVE A DRINK CONTAINING ALCOHOL: 4 OR MORE TIMES A WEEK
HAVE YOU OR SOMEONE ELSE BEEN INJURED AS A RESULT OF YOUR DRINKING: NO
HOW OFTEN DURING THE LAST YEAR HAVE YOU BEEN UNABLE TO REMEMBER WHAT HAPPENED THE NIGHT BEFORE BECAUSE YOU HAD BEEN DRINKING: NEVER
HOW OFTEN DURING THE LAST YEAR HAVE YOU FOUND THAT YOU WERE NOT ABLE TO STOP DRINKING ONCE YOU HAD STARTED: NEVER
HOW OFTEN DURING THE LAST YEAR HAVE YOU BEEN UNABLE TO REMEMBER WHAT HAPPENED THE NIGHT BEFORE BECAUSE YOU HAD BEEN DRINKING: NEVER
HOW OFTEN DURING THE LAST YEAR HAVE YOU FAILED TO DO WHAT WAS NORMALLY EXPECTED FROM YOU BECAUSE OF DRINKING: NEVER
HOW MANY STANDARD DRINKS CONTAINING ALCOHOL DO YOU HAVE ON A TYPICAL DAY: 1 OR 2
HOW OFTEN DURING THE LAST YEAR HAVE YOU HAD A FEELING OF GUILT OR REMORSE AFTER DRINKING: NEVER
HOW OFTEN DURING THE LAST YEAR HAVE YOU NEEDED AN ALCOHOLIC DRINK FIRST THING IN THE MORNING TO GET YOURSELF GOING AFTER A NIGHT OF HEAVY DRINKING: NEVER
HOW OFTEN DURING THE LAST YEAR HAVE YOU NEEDED AN ALCOHOLIC DRINK FIRST THING IN THE MORNING TO GET YOURSELF GOING AFTER A NIGHT OF HEAVY DRINKING: NEVER
HAVE YOU OR SOMEONE ELSE BEEN INJURED AS A RESULT OF YOUR DRINKING: NO
HOW OFTEN DO YOU HAVE A DRINK CONTAINING ALCOHOL: 5
HOW OFTEN DO YOU HAVE SIX OR MORE DRINKS ON ONE OCCASION: 1

## 2024-12-10 ENCOUNTER — OFFICE VISIT (OUTPATIENT)
Dept: FAMILY MEDICINE CLINIC | Age: 79
End: 2024-12-10

## 2024-12-10 VITALS
HEART RATE: 69 BPM | BODY MASS INDEX: 23.77 KG/M2 | TEMPERATURE: 97.9 F | SYSTOLIC BLOOD PRESSURE: 134 MMHG | OXYGEN SATURATION: 98 % | HEIGHT: 74 IN | DIASTOLIC BLOOD PRESSURE: 80 MMHG | WEIGHT: 185.2 LBS

## 2024-12-10 DIAGNOSIS — H61.21 IMPACTED CERUMEN OF RIGHT EAR: ICD-10-CM

## 2024-12-10 DIAGNOSIS — G43.009 MIGRAINE WITHOUT AURA AND WITHOUT STATUS MIGRAINOSUS, NOT INTRACTABLE: ICD-10-CM

## 2024-12-10 DIAGNOSIS — G25.0 BENIGN HEAD TREMOR: ICD-10-CM

## 2024-12-10 DIAGNOSIS — F41.1 ANXIETY STATE: ICD-10-CM

## 2024-12-10 DIAGNOSIS — Z85.46 HISTORY OF PROSTATE CANCER: ICD-10-CM

## 2024-12-10 DIAGNOSIS — K21.00 GASTROESOPHAGEAL REFLUX DISEASE WITH ESOPHAGITIS WITHOUT HEMORRHAGE: ICD-10-CM

## 2024-12-10 DIAGNOSIS — Z00.00 WELL ADULT EXAM: Primary | ICD-10-CM

## 2024-12-10 DIAGNOSIS — I10 ESSENTIAL HYPERTENSION, BENIGN: Chronic | ICD-10-CM

## 2024-12-10 RX ORDER — PANTOPRAZOLE SODIUM 40 MG/1
TABLET, DELAYED RELEASE ORAL
Qty: 90 TABLET | Refills: 3 | Status: SHIPPED | OUTPATIENT
Start: 2024-12-10

## 2024-12-10 RX ORDER — TROSPIUM CHLORIDE 20 MG/1
20 TABLET, FILM COATED ORAL DAILY
COMMUNITY

## 2024-12-10 NOTE — PROGRESS NOTES
Ear Irrigation Procedure Note    Ear irrigation procedure was performed using a WaterPik / Elephant ear to the right ear(s) for cerumen impaction.  The remaining wax and debris was removed with curette  instrumentation.  The procedure was successful.  The patient had no complications   
drinks containing alcohol do you have on a typical day when you are drinking? 1 or 2 1 or 2  1 or 2 1 or 2   Q3: How often do you have six or more drinks on one occasion? Never Never  Never Never           Care Team:  Patient's list of care team members was updated in EHR under the Snap Shot.   Dentist:   Eye:     Immunizations: Reviewed with patient.     Health Maintenance Due   Topic Date Due    Prostate Specific Antigen (PSA) Screening or Monitoring  06/21/2018    Lipids  12/30/2023       CHRONIC CONDITIONS / ACUTE COMPLAINTS     Migraines - gets every 3 weeks or so, sometimes couple days in a row. Takes fioricet and go away quickly.    GERD - taking pantoprazole daily and no sx. Sometimes when eating things like chicken will feel like slow to go down - about once a week or     Anxiety - following with Dr. Nunes.  has been a little more down but has appt tomorrow to discuss.     Has had a couple episodes when hasn't eaten well and will be walking the dog and got little light headed and a light thing in his eye and then disoriented and then got a HA. Had to sit down. States has had about 2 months ago and then many months before that. Was at eye doctor yesterday and everything looked ok. No chest pain or feeling going to pass out with it but did feel weak and sick.     States does note tremor in head a little worse than has been. Notes feeling more tense overall and notices less when less tense.    BP - checks at home and runs mid 130's usually. Taking lisinopril and metoprolol daily, no SE    Physical Exam:    Body mass index is 24.1 kg/m².  Vitals:    12/10/24 0801 12/10/24 0832   BP: 138/74 134/80   Site: Left Upper Arm    Position: Sitting    Cuff Size: Medium Adult    Pulse: 69    Temp: 97.9 °F (36.6 °C)    TempSrc: Infrared    SpO2: 98%    Weight: 84 kg (185 lb 3.2 oz)    Height: 1.867 m (6' 1.5\")      Wt Readings from Last 3 Encounters:   12/10/24 84 kg (185 lb 3.2 oz)   08/13/24 83.5 kg (184 lb)

## 2025-01-07 DIAGNOSIS — G47.00 INSOMNIA, UNSPECIFIED TYPE: ICD-10-CM

## 2025-01-07 RX ORDER — ZOLPIDEM TARTRATE 5 MG/1
TABLET ORAL
Qty: 30 TABLET | Refills: 5 | Status: SHIPPED | OUTPATIENT
Start: 2025-01-07 | End: 2025-02-06

## 2025-01-07 RX ORDER — LISINOPRIL 10 MG/1
10 TABLET ORAL DAILY
Qty: 90 TABLET | Refills: 1 | Status: SHIPPED | OUTPATIENT
Start: 2025-01-07

## 2025-01-07 NOTE — TELEPHONE ENCOUNTER
Medication:   Requested Prescriptions     Pending Prescriptions Disp Refills    zolpidem (AMBIEN) 5 MG tablet [Pharmacy Med Name: ZOLPIDEM TARTRATE 5 MG TABLET] 30 tablet      Sig: TAKE 1 TABLET BY MOUTH EVERY NIGHT AT BEDTIME AS NEEDED FOR SLEEP      Last Filled:  4/19/24 with 5 refills    Patient Phone Number: [phone] (home)     Last appt: 12/10/2024   Next appt: Visit date not found    Last OARRS:       12/11/2023     1:55 PM   RX Monitoring   Periodic Controlled Substance Monitoring No signs of potential drug abuse or diversion identified.     PDMP Monitoring:    Last PDMP Del as Reviewed ([place]):  Review User Review Instant Review Result   SHAHEEN REYNA 12/10/2024  8:41 AM Reviewed PDMP [1]     Preferred Pharmacy:   [facility] [id number] [place], [place] - [address] [facility] [address] [facility] [phone] - F [phone]  [address] [facility] [address]  Phone: [phone] Fax: [phone]    [facility] - [facility] [address] [facility] [phone] - F [phone]  [address] [facility] [address]  Phone: [phone] Fax: [phone]    [id number] [place], [place] - [address] [phone] - F [phone]  [address] [address] [address]  Phone: [phone] Fax: [phone]    [facility] [address] [facility] - [place], [place] - [address] - P [phone] - F [phone]  [address] [place] [address]  Phone: [phone] Fax: [phone]    [facility][id number] - [place], [place] - [address] [address] [address] - P [phone] - F [phone]  [address] [address] [facility] [address]  Phone: [phone] Fax: [phone]

## 2025-01-07 NOTE — TELEPHONE ENCOUNTER
Medication:   Requested Prescriptions     Pending Prescriptions Disp Refills    lisinopril (PRINIVIL;ZESTRIL) 10 MG tablet [Pharmacy Med Name: LISINOPRIL 10 MG TABLET] 90 tablet 1     Sig: TAKE 1 TABLET BY MOUTH DAILY        Patient Phone Number: [phone] (home)     Last appt: 12/10/2024   Next appt: Visit date not found    Last OARRS:       12/11/2023     1:55 PM   RX Monitoring   Periodic Controlled Substance Monitoring No signs of potential drug abuse or diversion identified.     PDMP Monitoring:    Last PDMP Del as Reviewed ([place]):  Review User Review Instant Review Result   SHAHEEN REYNA 12/10/2024  8:41 AM Reviewed PDMP [1]     Preferred Pharmacy:   [facility] [id number] - [facility] [address] [facility] [phone] - F [phone]  [address] [facility] [address]  Phone: [phone] Fax: [phone]    [facility] - [place], [place] - [address] - P [phone] - F [phone]  [address] [facility] [address]  Phone: [phone] Fax: [phone]    [facility] #[id number] - [place], [place] - [address] [address] - P [phone] - F [phone]  [address] [facility] [address]  Phone: [phone] Fax: [phone]    [facility] [address] [facility] - [place], [place] - [address] - P [phone] - F [phone]  [facility] [place] [address]  Phone: [phone] Fax: [phone]    [facility] #[id number] - [place], [place] - [address] [address] [address] - P [phone] - F [phone]  [address] [address] [facility] [address]  Phone: [phone] Fax: [phone]

## 2025-01-08 RX ORDER — TRAZODONE HYDROCHLORIDE 50 MG/1
TABLET, FILM COATED ORAL
Qty: 90 TABLET | Refills: 0 | Status: SHIPPED | OUTPATIENT
Start: 2025-01-08

## 2025-01-08 NOTE — TELEPHONE ENCOUNTER
Medication:   Requested Prescriptions     Pending Prescriptions Disp Refills    traZODone (DESYREL) 50 MG tablet [Pharmacy Med Name: traZODone 50 MG TABLET] 90 tablet 0     Sig: TAKE ONE TABLET BY MOUTH ONCE NIGHTLY      Last Filled:  10/21/24    Patient Phone Number: [phone] (home)     Last appt: 12/10/2024   Next appt: Visit date not found    Last OARRS:       12/11/2023     1:55 PM   RX Monitoring   Periodic Controlled Substance Monitoring No signs of potential drug abuse or diversion identified.     PDMP Monitoring:    Last PDMP Del as Reviewed ([place]):  Review User Review Instant Review Result   SHAHEEN REYNA 12/10/2024  8:41 AM Reviewed PDMP [1]     Preferred Pharmacy:   [facility] [id number] [facility] [address] [facility] [phone] - F [phone]  [address] [facility] [address]  Phone: [phone] Fax: [phone]    [facility] Mail Delivery - [facility] [address] [facility] [phone] - F [phone] [address] [facility] [address]  Phone: [phone] Fax: [phone]    [facility][id number] [facility] [address] [address] [phone] - F [phone]  [address] [facility] [address] [address]  Phone: [phone] Fax: [phone]    [facility] [address] [facility] - [place] - [address] [facility] P [phone] - F [phone]  [facility] [place] [address]  Phone: [phone] Fax: [phone]    [facility] #[id number] - [place], [place] - [address] [address] [address] - P [phone] - F [phone]  [address] [address] [facility] [address]  Phone: [phone] Fax: [phone]

## 2025-02-18 ENCOUNTER — PATIENT MESSAGE (OUTPATIENT)
Dept: FAMILY MEDICINE CLINIC | Age: 80
End: 2025-02-18

## 2025-02-18 RX ORDER — NYSTATIN 100000 [USP'U]/ML
500000 SUSPENSION ORAL 4 TIMES DAILY
Qty: 140 ML | Refills: 0 | Status: SHIPPED | OUTPATIENT
Start: 2025-02-18 | End: 2025-02-25

## 2025-03-24 ENCOUNTER — RESULTS FOLLOW-UP (OUTPATIENT)
Dept: FAMILY MEDICINE CLINIC | Age: 80
End: 2025-03-24

## 2025-03-24 LAB
ALBUMIN: 4.6 G/DL (ref 3.5–5.7)
ALP BLD-CCNC: 60 IU/L (ref 35–135)
ALT SERPL-CCNC: 21 IU/L (ref 10–60)
ANION GAP SERPL CALCULATED.3IONS-SCNC: 4 MMOL/L (ref 4–16)
AST SERPL-CCNC: 20 IU/L (ref 10–40)
BILIRUB SERPL-MCNC: 0.6 MG/DL (ref 0–1.2)
BUN BLDV-MCNC: 20 MG/DL (ref 8–26)
CALCIUM SERPL-MCNC: 9.4 MG/DL (ref 8.5–10.4)
CHLORIDE BLD-SCNC: 106 MMOL/L (ref 98–111)
CHOLESTEROL, TOTAL: 170 MG/DL
CO2: 31 MMOL/L (ref 21–31)
CREAT SERPL-MCNC: 1.01 MG/DL (ref 0.7–1.3)
EGFR (CKD-EPI): 76 ML/MIN/1.73 M2
GLUCOSE BLD-MCNC: 87 MG/DL (ref 70–99)
HDLC SERPL-MCNC: 61 MG/DL
LDL CHOLESTEROL: 92 MG/DL
MAGNESIUM: 2 MG/DL (ref 1.7–2.4)
NONHDLC SERPL-MCNC: 109 MG/DL
POTASSIUM SERPL-SCNC: 4.8 MMOL/L (ref 3.6–5.1)
SODIUM BLD-SCNC: 141 MMOL/L (ref 135–145)
TOTAL PROTEIN: 6 G/DL (ref 6–8)
TRIGL SERPL-MCNC: 83 MG/DL

## 2025-04-03 RX ORDER — TRAZODONE HYDROCHLORIDE 50 MG/1
50 TABLET ORAL NIGHTLY
Qty: 90 TABLET | Refills: 0 | Status: SHIPPED | OUTPATIENT
Start: 2025-04-03

## 2025-05-14 NOTE — LETTER
Airway    Performed by: Benedict Pantoja MD  Authorized by: Benedict Pantoja Please schedule the following with:     Date:   20  @ 8:15    Account: [de-identified]  Patient: Angela Paez    : 1945  Address:  [address]    Phone (H):  [phone] (home)      ----------------------------------------------------------------------------------------------  Diagnosis:     ICD-10-CM    1. Lumbar radiculitis  M54.16    2. HNP (herniated nucleus pulposus), lumbar  M51.26    3. Lumbar stenosis without neurogenic claudication  M48.061          Levels:Right L5 and S1 Transforaminal GRIS   CPT Codes [id number], [id number]    ----------------------------------------------------------------------------------------------  Injection # 1   [facility]    Attending Physician       Coby Barakat MD.  ----------------------------------------------------------------------------------------------  Injection Scheduled For:    At:    1st Insurance [facility]   Pre-Cert#    2nd Insurance     Pre-Cert#    Comments or Special instructions:  COVID TEST: yes - RAPID    · Infection control  · Tested positive for MRSA in past 12 months:  no  · Tested positive for MSSA \"staph infection\" in past 12 months: no  · Tested positive for VRE (Vancomycin Resistant Enterococci) in past 12 months:   no  · Currently on any antibiotics for an infection: no  · Anticoagulants:  · On a blood thinner:  no   · Any history of bleeding disorder: no   · Advanced Liver disease: no   · Advanced Renal disease: no   · Glaucoma: no   · Diabetes: no     Sedation:  Yes  -----------------------------------------------------------------------------------------------  Allergies   Allergen Reactions    Oxycodone      \"severe hiccups\" per patient on at least 2 occasions; does tolerate tramadol

## 2025-05-22 DIAGNOSIS — G43.009 MIGRAINE WITHOUT AURA AND WITHOUT STATUS MIGRAINOSUS, NOT INTRACTABLE: Primary | ICD-10-CM

## 2025-05-22 RX ORDER — BUTALBITAL, ACETAMINOPHEN AND CAFFEINE 50; 325; 40 MG/1; MG/1; MG/1
1 TABLET ORAL EVERY 4 HOURS PRN
Qty: 180 TABLET | Refills: 0 | Status: SHIPPED | OUTPATIENT
Start: 2025-05-22

## 2025-05-22 NOTE — TELEPHONE ENCOUNTER
Medication:   Requested Prescriptions     Pending Prescriptions Disp Refills    butalbital-acetaminophen-caffeine (FIORICET, ESGIC) -40 MG per tablet [Pharmacy Med Name: NTQYVW-BFDVWWRM-BUOR -40] 180 tablet 2     Sig: TAKE 1 TABLET BY MOUTH EVERY 4 HOURS AS NEEDED FOR HEADACHES      Last Filled:  6/12    Patient Phone Number: [phone] (home)     Last appt: 12/10/2024   Next appt: Visit date not found    Last OARRS:       12/11/2023     1:55 PM   RX Monitoring   Periodic Controlled Substance Monitoring No signs of potential drug abuse or diversion identified.     PDMP Monitoring:    Last PDMP Del as Reviewed ([place]):  Review User Review Instant Review Result   SHAHEEN REYNA 12/10/2024  8:41 AM Reviewed PDMP [1]     Preferred Pharmacy:   [facility] [id number] [place], [place] - [address] [facility] [phone] - F [phone]  [address] [facility] [address]  Phone: [phone] Fax: [phone]    [facility] - [facility] [address] [facility] [phone] - F [phone] [address] [facility] [address]  Phone: [phone] Fax: [phone]    [facility][id number] - [facility] [address] [address] [facility] [phone] - F [phone]  [address] [facility] [address]  Phone: [phone] Fax: [phone]    [facility] [address] [facility] - [place], [place] - [address]  P [phone] - F [phone]  [facility] [place] [address]  Phone: [phone] Fax: [phone]    [facility] #[id number] - [place], [place] - [address] [address] [address] - P [phone] - F [phone]  [facility] [address] [facility] [address]  Phone: [phone] Fax: [phone]

## 2025-06-18 ENCOUNTER — PATIENT MESSAGE (OUTPATIENT)
Dept: FAMILY MEDICINE CLINIC | Age: 80
End: 2025-06-18

## 2025-06-18 RX ORDER — NYSTATIN 100000 [USP'U]/ML
500000 SUSPENSION ORAL 4 TIMES DAILY
Qty: 140 ML | Refills: 0 | Status: SHIPPED | OUTPATIENT
Start: 2025-06-18 | End: 2025-06-25

## 2025-06-30 DIAGNOSIS — I10 ESSENTIAL HYPERTENSION, BENIGN: Chronic | ICD-10-CM

## 2025-06-30 RX ORDER — LISINOPRIL 10 MG/1
10 TABLET ORAL DAILY
Qty: 90 TABLET | Refills: 1 | Status: SHIPPED | OUTPATIENT
Start: 2025-06-30

## 2025-06-30 RX ORDER — METOPROLOL SUCCINATE 50 MG/1
50 TABLET, EXTENDED RELEASE ORAL DAILY
Qty: 90 TABLET | Refills: 1 | Status: SHIPPED | OUTPATIENT
Start: 2025-06-30

## 2025-06-30 RX ORDER — TRAZODONE HYDROCHLORIDE 50 MG/1
TABLET ORAL
Qty: 90 TABLET | Refills: 1 | Status: SHIPPED | OUTPATIENT
Start: 2025-06-30

## 2025-07-07 RX ORDER — NYSTATIN 100000 [USP'U]/ML
SUSPENSION ORAL
Qty: 140 ML | Refills: 0 | Status: SHIPPED | OUTPATIENT
Start: 2025-07-07

## 2025-08-09 DIAGNOSIS — K58.0 IRRITABLE BOWEL SYNDROME WITH DIARRHEA: Primary | ICD-10-CM

## 2025-08-09 DIAGNOSIS — G47.00 INSOMNIA, UNSPECIFIED TYPE: ICD-10-CM

## 2025-08-11 RX ORDER — ZOLPIDEM TARTRATE 5 MG/1
TABLET ORAL
Qty: 30 TABLET | Refills: 5 | Status: SHIPPED | OUTPATIENT
Start: 2025-08-11 | End: 2025-09-10

## 2025-08-11 RX ORDER — DICYCLOMINE HYDROCHLORIDE 10 MG/1
CAPSULE ORAL
Qty: 90 CAPSULE | Refills: 1 | Status: SHIPPED | OUTPATIENT
Start: 2025-08-11

## 2025-08-13 ENCOUNTER — PATIENT MESSAGE (OUTPATIENT)
Dept: FAMILY MEDICINE CLINIC | Age: 80
End: 2025-08-13

## 2025-08-13 DIAGNOSIS — K14.0 TONGUE ULCER: Primary | ICD-10-CM

## 2025-08-22 ENCOUNTER — OFFICE VISIT (OUTPATIENT)
Dept: FAMILY MEDICINE CLINIC | Age: 80
End: 2025-08-22

## 2025-08-22 VITALS
BODY MASS INDEX: 23.74 KG/M2 | SYSTOLIC BLOOD PRESSURE: 138 MMHG | OXYGEN SATURATION: 98 % | WEIGHT: 182.4 LBS | DIASTOLIC BLOOD PRESSURE: 72 MMHG | HEART RATE: 54 BPM

## 2025-08-22 DIAGNOSIS — K14.8 LESION OF TONGUE: ICD-10-CM

## 2025-08-22 DIAGNOSIS — Z12.11 SCREEN FOR COLON CANCER: ICD-10-CM

## 2025-08-22 DIAGNOSIS — B37.0 THRUSH: ICD-10-CM

## 2025-08-22 DIAGNOSIS — K13.21 LEUKOPLAKIA OF TONGUE: Primary | ICD-10-CM

## 2025-08-22 LAB
BASOPHILS ABSOLUTE: 0.1 THOU/MCL (ref 0–0.2)
BASOPHILS ABSOLUTE: 2 %
EOSINOPHILS ABSOLUTE: 0.1 THOU/MCL (ref 0.03–0.45)
EOSINOPHILS RELATIVE PERCENT: 1 %
HCT VFR BLD CALC: 45.2 % (ref 40–50)
HEMOGLOBIN: 15.2 G/DL (ref 13.5–16.5)
HIV 1/2 ANTIBODY: NONREACTIVE
HIV-1 P24 AG: NONREACTIVE
LYMPHOCYTES ABSOLUTE: 1.2 THOU/MCL (ref 1–4)
LYMPHOCYTES RELATIVE PERCENT: 21 %
MCH RBC QN AUTO: 29.6 PG (ref 27–33)
MCHC RBC AUTO-ENTMCNC: 33.6 G/DL (ref 32–36)
MCV RBC AUTO: 88 FL (ref 82–97)
MONOCYTES ABSOLUTE: 0.6 THOU/MCL (ref 0.2–0.9)
MONOCYTES RELATIVE PERCENT: 11 %
NEUTROPHILS ABSOLUTE: 3.6 THOU/MCL (ref 1.8–7.7)
PDW BLD-RTO: 13.4 %
PLATELET # BLD: 156 THOU/MCL (ref 140–375)
PMV BLD AUTO: 8.1 FL (ref 7–11.5)
RBC # BLD: 5.13 MIL/MCL (ref 4.4–5.8)
SEG NEUTROPHILS: 65 %
WBC # BLD: 5.5 THOU/MCL (ref 3.6–10.5)

## 2025-08-22 SDOH — ECONOMIC STABILITY: FOOD INSECURITY: WITHIN THE PAST 12 MONTHS, THE FOOD YOU BOUGHT JUST DIDN'T LAST AND YOU DIDN'T HAVE MONEY TO GET MORE.: NEVER TRUE

## 2025-08-22 SDOH — ECONOMIC STABILITY: FOOD INSECURITY: WITHIN THE PAST 12 MONTHS, YOU WORRIED THAT YOUR FOOD WOULD RUN OUT BEFORE YOU GOT MONEY TO BUY MORE.: NEVER TRUE

## 2025-08-22 ASSESSMENT — PATIENT HEALTH QUESTIONNAIRE - PHQ9
SUM OF ALL RESPONSES TO PHQ QUESTIONS 1-9: 0
2. FEELING DOWN, DEPRESSED OR HOPELESS: NOT AT ALL
SUM OF ALL RESPONSES TO PHQ QUESTIONS 1-9: 0
1. LITTLE INTEREST OR PLEASURE IN DOING THINGS: NOT AT ALL

## 2025-08-27 ENCOUNTER — OFFICE VISIT (OUTPATIENT)
Dept: CARDIOLOGY CLINIC | Age: 80
End: 2025-08-27
Payer: MEDICARE

## 2025-08-27 VITALS
BODY MASS INDEX: 23.86 KG/M2 | HEART RATE: 67 BPM | SYSTOLIC BLOOD PRESSURE: 110 MMHG | HEIGHT: 73 IN | WEIGHT: 180 LBS | OXYGEN SATURATION: 98 % | DIASTOLIC BLOOD PRESSURE: 64 MMHG

## 2025-08-27 DIAGNOSIS — I35.1 AORTIC VALVE INSUFFICIENCY, ETIOLOGY OF CARDIAC VALVE DISEASE UNSPECIFIED: ICD-10-CM

## 2025-08-27 DIAGNOSIS — I10 ESSENTIAL HYPERTENSION, BENIGN: Primary | Chronic | ICD-10-CM

## 2025-08-27 DIAGNOSIS — Z82.49 FAMILY HISTORY OF CORONARY ARTERY DISEASE: ICD-10-CM

## 2025-08-27 DIAGNOSIS — R06.02 SHORTNESS OF BREATH: ICD-10-CM

## 2025-08-27 DIAGNOSIS — E78.2 MIXED HYPERLIPIDEMIA: ICD-10-CM

## 2025-08-27 DIAGNOSIS — I35.0 NONRHEUMATIC AORTIC VALVE STENOSIS: ICD-10-CM

## 2025-08-27 PROCEDURE — 93000 ELECTROCARDIOGRAM COMPLETE: CPT | Performed by: INTERNAL MEDICINE

## 2025-08-27 PROCEDURE — 3078F DIAST BP <80 MM HG: CPT | Performed by: INTERNAL MEDICINE

## 2025-08-27 PROCEDURE — 3074F SYST BP LT 130 MM HG: CPT | Performed by: INTERNAL MEDICINE

## 2025-08-27 PROCEDURE — 1159F MED LIST DOCD IN RCRD: CPT | Performed by: INTERNAL MEDICINE

## 2025-08-27 PROCEDURE — 1123F ACP DISCUSS/DSCN MKR DOCD: CPT | Performed by: INTERNAL MEDICINE

## 2025-08-27 PROCEDURE — G2211 COMPLEX E/M VISIT ADD ON: HCPCS | Performed by: INTERNAL MEDICINE

## 2025-08-27 PROCEDURE — 99214 OFFICE O/P EST MOD 30 MIN: CPT | Performed by: INTERNAL MEDICINE

## 2025-09-03 ENCOUNTER — OFFICE VISIT (OUTPATIENT)
Dept: ENT CLINIC | Age: 80
End: 2025-09-03
Payer: MEDICARE

## 2025-09-03 VITALS
OXYGEN SATURATION: 96 % | HEART RATE: 65 BPM | WEIGHT: 181 LBS | HEIGHT: 73 IN | TEMPERATURE: 97.2 F | DIASTOLIC BLOOD PRESSURE: 54 MMHG | BODY MASS INDEX: 23.99 KG/M2 | SYSTOLIC BLOOD PRESSURE: 145 MMHG

## 2025-09-03 DIAGNOSIS — Z86.19 HISTORY OF THRUSH: ICD-10-CM

## 2025-09-03 DIAGNOSIS — Z87.19 HISTORY OF ORAL APHTHOUS ULCERS: ICD-10-CM

## 2025-09-03 DIAGNOSIS — K13.70 LESION OF BUCCAL MUCOSA: Primary | ICD-10-CM

## 2025-09-03 PROCEDURE — 3077F SYST BP >= 140 MM HG: CPT | Performed by: OTOLARYNGOLOGY

## 2025-09-03 PROCEDURE — 1123F ACP DISCUSS/DSCN MKR DOCD: CPT | Performed by: OTOLARYNGOLOGY

## 2025-09-03 PROCEDURE — 1126F AMNT PAIN NOTED NONE PRSNT: CPT | Performed by: OTOLARYNGOLOGY

## 2025-09-03 PROCEDURE — 1159F MED LIST DOCD IN RCRD: CPT | Performed by: OTOLARYNGOLOGY

## 2025-09-03 PROCEDURE — 99203 OFFICE O/P NEW LOW 30 MIN: CPT | Performed by: OTOLARYNGOLOGY

## 2025-09-03 PROCEDURE — 3078F DIAST BP <80 MM HG: CPT | Performed by: OTOLARYNGOLOGY

## (undated) DEVICE — CHLORAPREP 26ML ORANGE

## (undated) DEVICE — Device: Brand: JELCO

## (undated) DEVICE — SYRINGE MED 3ML CLR PLAS STD N CTRL LUERLOCK TIP DISP

## (undated) DEVICE — DISPOSABLE OR TOWEL: Brand: CARDINAL HEALTH

## (undated) DEVICE — STANDARD HYPODERMIC NEEDLE,POLYPROPYLENE HUB: Brand: MONOJECT

## (undated) DEVICE — STERILE POLYISOPRENE POWDER-FREE SURGICAL GLOVES: Brand: PROTEXIS

## (undated) DEVICE — PEN: MARKING STD 100/CS: Brand: MEDICAL ACTION INDUSTRIES

## (undated) DEVICE — MEDIA CONTRAST RX ISOVUE-300 61% 30ML VIALS

## (undated) DEVICE — UNIVERSAL BLOCK TRAY: Brand: AVANOS*

## (undated) DEVICE — NEEDLE SPNL 22GA L3.5IN BLK HUB S STL REG WALL FIT STYL W/